# Patient Record
Sex: FEMALE | Race: WHITE | NOT HISPANIC OR LATINO | Employment: FULL TIME | URBAN - METROPOLITAN AREA
[De-identification: names, ages, dates, MRNs, and addresses within clinical notes are randomized per-mention and may not be internally consistent; named-entity substitution may affect disease eponyms.]

---

## 2017-02-20 ENCOUNTER — OFFICE VISIT (OUTPATIENT)
Dept: FAMILY MEDICINE CLINIC | Facility: CLINIC | Age: 37
End: 2017-02-20

## 2017-02-20 VITALS
HEART RATE: 67 BPM | SYSTOLIC BLOOD PRESSURE: 118 MMHG | HEIGHT: 68 IN | WEIGHT: 237 LBS | OXYGEN SATURATION: 98 % | BODY MASS INDEX: 35.92 KG/M2 | TEMPERATURE: 98.3 F | DIASTOLIC BLOOD PRESSURE: 80 MMHG

## 2017-02-20 DIAGNOSIS — F41.9 ANXIETY AND DEPRESSION: Primary | ICD-10-CM

## 2017-02-20 DIAGNOSIS — F32.A ANXIETY AND DEPRESSION: Primary | ICD-10-CM

## 2017-02-20 PROCEDURE — 99213 OFFICE O/P EST LOW 20 MIN: CPT | Performed by: FAMILY MEDICINE

## 2017-08-30 ENCOUNTER — OFFICE VISIT (OUTPATIENT)
Dept: FAMILY MEDICINE CLINIC | Facility: CLINIC | Age: 37
End: 2017-08-30

## 2017-08-30 VITALS
OXYGEN SATURATION: 98 % | DIASTOLIC BLOOD PRESSURE: 80 MMHG | BODY MASS INDEX: 36.58 KG/M2 | TEMPERATURE: 98.5 F | HEIGHT: 69 IN | HEART RATE: 64 BPM | SYSTOLIC BLOOD PRESSURE: 120 MMHG | WEIGHT: 247 LBS

## 2017-08-30 DIAGNOSIS — R10.9 SIDE PAIN: Primary | ICD-10-CM

## 2017-08-30 DIAGNOSIS — L98.9 SKIN LESION: ICD-10-CM

## 2017-08-30 PROCEDURE — 99213 OFFICE O/P EST LOW 20 MIN: CPT | Performed by: FAMILY MEDICINE

## 2017-08-30 NOTE — PROGRESS NOTES
Subjective   Sameera Marrero is a 36 y.o. female.   Chief Complaint   Patient presents with   • Skin Lesion   • side pain       History of Present Illness     #1 Side pain-it is on the right.  It started a few months ago.  It is on and off, achy pain.  Nothing makes it better or worse.  Intensity 4 out of 10.  There are no other symptoms associated.  No blood in urine.  It is not related to meals.  Patient had cholecystectomy in 2016.    #2 skin lesion-patient noticed it in April 2017.  It is on her head.  Patient was evaluated by dermatologist.  She had biopsy done which came up benign.  Patient was prescribed steroid cream and is going to start it today.  She is scheduled for follow-up with her dermatologist in one month.    The following portions of the patient's history were reviewed and updated as appropriate: allergies, current medications, past family history, past medical history, past social history, past surgical history and problem list.    Review of Systems   Constitutional: Negative.    HENT: Negative.    Respiratory: Negative.    Cardiovascular: Negative.    Gastrointestinal: Negative for abdominal pain and blood in stool.   Skin: Negative for wound.   Psychiatric/Behavioral: The patient is nervous/anxious.          Objective   Wt Readings from Last 3 Encounters:   08/30/17 247 lb (112 kg)   03/26/17 226 lb (103 kg)   02/20/17 237 lb (108 kg)      Vitals:    08/30/17 0920   BP: 120/80   Pulse: 64   Temp: 98.5 °F (36.9 °C)   SpO2: 98%     Temp Readings from Last 3 Encounters:   08/30/17 98.5 °F (36.9 °C)   03/26/17 97.7 °F (36.5 °C) (Oral)   02/20/17 98.3 °F (36.8 °C)     BP Readings from Last 3 Encounters:   08/30/17 120/80   03/26/17 114/70   02/20/17 118/80     Pulse Readings from Last 3 Encounters:   08/30/17 64   03/26/17 65   02/20/17 67       Physical Exam   Constitutional: She is oriented to person, place, and time. She appears well-developed and well-nourished.   HENT:   Head: Normocephalic  and atraumatic.   Neck: Neck supple. Carotid bruit is not present. No thyromegaly present.   Cardiovascular: Normal rate, regular rhythm and normal heart sounds.    Pulmonary/Chest: Effort normal and breath sounds normal.   Abdominal: Soft. She exhibits no distension and no mass. There is no tenderness.   No CVA.   Neurological: She is alert and oriented to person, place, and time.   Skin: Skin is warm, dry and intact.   Pink flat lesion on Lt scalp.   Psychiatric: She has a normal mood and affect. Her behavior is normal.       Assessment/Plan   Sameera was seen today for skin lesion and side pain.    Diagnoses and all orders for this visit:    Side pain    Skin lesion        #1 side pain-seems to be muscular.  Tylenol if needed.  Return to clinic if symptoms are not resolved in a few weeks, or sooner if worsening.      #2 skin lesion-follow-up per dermatologist.

## 2018-04-18 ENCOUNTER — CLINICAL SUPPORT (OUTPATIENT)
Dept: INTERNAL MEDICINE | Facility: CLINIC | Age: 38
End: 2018-04-18

## 2018-04-18 DIAGNOSIS — Z23 NEED FOR HEPATITIS A IMMUNIZATION: Primary | ICD-10-CM

## 2018-04-18 PROCEDURE — 90632 HEPA VACCINE ADULT IM: CPT | Performed by: FAMILY MEDICINE

## 2018-04-18 PROCEDURE — 90471 IMMUNIZATION ADMIN: CPT | Performed by: FAMILY MEDICINE

## 2018-04-24 ENCOUNTER — HOSPITAL ENCOUNTER (EMERGENCY)
Facility: HOSPITAL | Age: 38
Discharge: HOME | End: 2018-04-25
Attending: EMERGENCY MEDICINE | Admitting: EMERGENCY MEDICINE

## 2018-04-24 DIAGNOSIS — L23.7 ALLERGIC DERMATITIS DUE TO POISON IVY: ICD-10-CM

## 2018-04-24 DIAGNOSIS — L03.113 RIGHT ARM CELLULITIS: Primary | ICD-10-CM

## 2018-04-24 PROCEDURE — 96365 THER/PROPH/DIAG IV INF INIT: CPT

## 2018-04-24 PROCEDURE — 96375 TX/PRO/DX INJ NEW DRUG ADDON: CPT

## 2018-04-24 PROCEDURE — 25010000002 METHYLPREDNISOLONE PER 125 MG: Performed by: NURSE PRACTITIONER

## 2018-04-24 PROCEDURE — 85025 COMPLETE CBC W/AUTO DIFF WBC: CPT | Performed by: NURSE PRACTITIONER

## 2018-04-24 PROCEDURE — 99283 EMERGENCY DEPT VISIT LOW MDM: CPT

## 2018-04-24 PROCEDURE — 80048 BASIC METABOLIC PNL TOTAL CA: CPT | Performed by: NURSE PRACTITIONER

## 2018-04-24 PROCEDURE — 25010000003 CEFAZOLIN 1 GM/50ML SOLUTION: Performed by: NURSE PRACTITIONER

## 2018-04-24 RX ORDER — METHYLPREDNISOLONE SODIUM SUCCINATE 125 MG/2ML
125 INJECTION, POWDER, LYOPHILIZED, FOR SOLUTION INTRAMUSCULAR; INTRAVENOUS ONCE
Status: COMPLETED | OUTPATIENT
Start: 2018-04-24 | End: 2018-04-24

## 2018-04-24 RX ORDER — PREDNISONE 20 MG/1
20 TABLET ORAL DAILY
COMMUNITY
End: 2018-11-20

## 2018-04-24 RX ORDER — SODIUM CHLORIDE 0.9 % (FLUSH) 0.9 %
10 SYRINGE (ML) INJECTION AS NEEDED
Status: DISCONTINUED | OUTPATIENT
Start: 2018-04-24 | End: 2018-04-25 | Stop reason: HOSPADM

## 2018-04-24 RX ADMIN — METHYLPREDNISOLONE SODIUM SUCCINATE 125 MG: 125 INJECTION, POWDER, FOR SOLUTION INTRAMUSCULAR; INTRAVENOUS at 23:56

## 2018-04-24 RX ADMIN — CEFAZOLIN SODIUM 1 G: 1 INJECTION, SOLUTION INTRAVENOUS at 23:56

## 2018-04-25 VITALS
HEIGHT: 68 IN | BODY MASS INDEX: 37.89 KG/M2 | SYSTOLIC BLOOD PRESSURE: 118 MMHG | WEIGHT: 250 LBS | DIASTOLIC BLOOD PRESSURE: 74 MMHG | OXYGEN SATURATION: 97 % | TEMPERATURE: 99.1 F | HEART RATE: 82 BPM | RESPIRATION RATE: 18 BRPM

## 2018-04-25 LAB
ANION GAP SERPL CALCULATED.3IONS-SCNC: 14.3 MMOL/L
BASOPHILS # BLD AUTO: 0.03 10*3/MM3 (ref 0–0.2)
BASOPHILS NFR BLD AUTO: 0.3 % (ref 0–1.5)
BUN BLD-MCNC: 12 MG/DL (ref 6–20)
BUN/CREAT SERPL: 16.7 (ref 7–25)
CALCIUM SPEC-SCNC: 9.9 MG/DL (ref 8.6–10.5)
CHLORIDE SERPL-SCNC: 102 MMOL/L (ref 98–107)
CO2 SERPL-SCNC: 23.7 MMOL/L (ref 22–29)
CREAT BLD-MCNC: 0.72 MG/DL (ref 0.57–1)
DEPRECATED RDW RBC AUTO: 43.5 FL (ref 37–54)
EOSINOPHIL # BLD AUTO: 0.25 10*3/MM3 (ref 0–0.7)
EOSINOPHIL NFR BLD AUTO: 2.4 % (ref 0.3–6.2)
ERYTHROCYTE [DISTWIDTH] IN BLOOD BY AUTOMATED COUNT: 13.2 % (ref 11.7–13)
GFR SERPL CREATININE-BSD FRML MDRD: 92 ML/MIN/1.73
GLUCOSE BLD-MCNC: 90 MG/DL (ref 65–99)
HCT VFR BLD AUTO: 45.7 % (ref 35.6–45.5)
HGB BLD-MCNC: 14.8 G/DL (ref 11.9–15.5)
IMM GRANULOCYTES # BLD: 0.05 10*3/MM3 (ref 0–0.03)
IMM GRANULOCYTES NFR BLD: 0.5 % (ref 0–0.5)
LYMPHOCYTES # BLD AUTO: 2.71 10*3/MM3 (ref 0.9–4.8)
LYMPHOCYTES NFR BLD AUTO: 25.9 % (ref 19.6–45.3)
MCH RBC QN AUTO: 29.5 PG (ref 26.9–32)
MCHC RBC AUTO-ENTMCNC: 32.4 G/DL (ref 32.4–36.3)
MCV RBC AUTO: 91.2 FL (ref 80.5–98.2)
MONOCYTES # BLD AUTO: 0.69 10*3/MM3 (ref 0.2–1.2)
MONOCYTES NFR BLD AUTO: 6.6 % (ref 5–12)
NEUTROPHILS # BLD AUTO: 6.74 10*3/MM3 (ref 1.9–8.1)
NEUTROPHILS NFR BLD AUTO: 64.3 % (ref 42.7–76)
PLATELET # BLD AUTO: 219 10*3/MM3 (ref 140–500)
PMV BLD AUTO: 10.4 FL (ref 6–12)
POTASSIUM BLD-SCNC: 3.5 MMOL/L (ref 3.5–5.2)
RBC # BLD AUTO: 5.01 10*6/MM3 (ref 3.9–5.2)
SODIUM BLD-SCNC: 140 MMOL/L (ref 136–145)
WBC NRBC COR # BLD: 10.47 10*3/MM3 (ref 4.5–10.7)

## 2018-04-25 RX ORDER — CEPHALEXIN 500 MG/1
500 CAPSULE ORAL 4 TIMES DAILY
Qty: 20 CAPSULE | Refills: 0 | Status: SHIPPED | OUTPATIENT
Start: 2018-04-25 | End: 2018-04-30

## 2018-04-25 NOTE — ED TRIAGE NOTES
Pt reports she went to the [facility] today for possible poison ivy,  Pt states that now there are streaks going up her arm.

## 2018-04-25 NOTE — ED NOTES
Patient provided discharge instructions at this time.  Respirations are even and unlabored, chest rise and fall is equal in expansion.  All patients questions answered prior to departure from the ED.  Pt ambulated from ED with steady gait, capillary refill within normal limit, speech normal.       Aria Blas RN  04/25/18 0052

## 2018-04-25 NOTE — ED PROVIDER NOTES
EMERGENCY DEPARTMENT ENCOUNTER    Room Number:  19/19  Date seen:  4/25/2018  Time seen: 11:14 PM  PCP: Patito Carranza MD    HPI:  Chief complaint: rash  Context:Sameera Marrero is a 37 y.o. female who presents to the ED with c/o rash to her R forearm for the past 3 days with clear discharge. Pt states that she believes that she came into contact with poison ivy while cutting down plants in her yard. Pt also c/o R arm swelling and redness streaking up her arm. Pt denies SOA. Pt states that she was previously seen at a [facility] and was d/c on prednisone, but has not started this yet.     Timing: constant   Duration: 3 days   Location: R forearm   Quality: rash   Intensity/Severity: moderate   Associated Symptoms:R arm swelling and redness   Aggravating Factors: none stated   Alleviating Factors: none stated   Previous Episodes: none stated   Treatment before arrival: none    MEDICAL RECORD REVIEW    ALLERGIES  Review of patient's allergies indicates no known allergies.    PAST MEDICAL HISTORY  Active Ambulatory Problems     Diagnosis Date Noted   • Avitaminosis D 03/11/2016   • Hemorrhoids 06/20/2016     Resolved Ambulatory Problems     Diagnosis Date Noted   • No Resolved Ambulatory Problems     Past Medical History:   Diagnosis Date   • Anxiety    • Anxiety    • Bladder disorder    • Gallstones    • GERD (gastroesophageal reflux disease)    • Nausea    • Vitamin D deficiency        PAST SURGICAL HISTORY  Past Surgical History:   Procedure Laterality Date   • CHOLECYSTECTOMY WITH INTRAOPERATIVE CHOLANGIOGRAM N/A 10/25/2016    Procedure: LAPAROSCOPIC CHOLECYSTECTOMY WITH CHOLANGIOGRAM;  Surgeon: Delvin Torres MD;  Location: [facility];  Service:    • EXCISIONAL HEMORRHOIDECTOMY      IN OFFICE   • WISDOM TOOTH EXTRACTION         FAMILY HISTORY  Family History   Problem Relation Age of Onset   • Cancer Maternal Grandfather    • Cancer Other      COLON       SOCIAL HISTORY  Social History     Social  History   • Marital status: Single     Spouse name: N/A   • Number of children: N/A   • Years of education: N/A     Occupational History   • Not on file.     Social History Main Topics   • Smoking status: Never Smoker   • Smokeless tobacco: Never Used   • Alcohol use Yes      Comment:  1x a week BUT NOT RECENTLY   • Drug use: No   • Sexual activity: Not on file     Other Topics Concern   • Not on file     Social History Narrative   • No narrative on file       REVIEW OF SYSTEMS  Review of Systems   Constitutional: Negative for activity change, appetite change, diaphoresis and fever.   HENT: Negative for trouble swallowing.    Eyes: Negative for visual disturbance.   Respiratory: Negative for cough, chest tightness, shortness of breath and wheezing.    Cardiovascular: Negative for chest pain, palpitations and leg swelling.   Gastrointestinal: Negative for abdominal pain, diarrhea, nausea and vomiting.   Genitourinary: Negative for dysuria.   Musculoskeletal: Negative for back pain.        R arm swelling   Skin: Positive for color change (redness streaking up R arm) and rash.   Neurological: Negative for dizziness, speech difficulty and light-headedness.       PHYSICAL EXAM  ED Triage Vitals   Temp Heart Rate Resp BP SpO2   04/24/18 2307 04/24/18 2307 04/24/18 2307 04/24/18 2313 04/24/18 2307   99.1 °F (37.3 °C) 94 18 135/95 100 %      Temp src Heart Rate Source Patient Position BP Location FiO2 (%)   04/24/18 2307 -- 04/24/18 2313 04/24/18 2313 --   Tympanic  Sitting Left arm      Physical Exam   Constitutional: She is oriented to person, place, and time and well-developed, well-nourished, and in no distress. No distress.   HENT:   Head: Normocephalic and atraumatic.   Mouth/Throat: Uvula is midline and mucous membranes are normal.   Neck: Normal range of motion. Neck supple.   Cardiovascular: S1 normal, S2 normal and normal heart sounds.  Exam reveals no gallop and no friction rub.    No murmur  heard.  Pulmonary/Chest: Effort normal and breath sounds normal. She has no decreased breath sounds. She has no wheezes. She has no rhonchi. She has no rales.   Abdominal: Soft. Normal appearance. There is no rebound and no guarding.   Musculoskeletal: Normal range of motion.        Arms:  Lymphadenopathy:     She has no axillary adenopathy.        Right axillary: No pectoral and no lateral adenopathy present.   Neurological: She is alert and oriented to person, place, and time.   Skin: Skin is warm, dry and intact. Rash (to R forearm consistent with poison ivy) noted. There is erythema (medial R arm, both above and below R AC space).   Psychiatric: Affect and judgment normal.   Nursing note and vitals reviewed.      LAB RESULTS  Recent Results (from the past 24 hour(s))   CBC Auto Differential    Collection Time: 04/24/18 11:47 PM   Result Value Ref Range    WBC 10.47 4.50 - 10.70 10*3/mm3    RBC 5.01 3.90 - 5.20 10*6/mm3    Hemoglobin 14.8 11.9 - 15.5 g/dL    Hematocrit 45.7 (H) 35.6 - 45.5 %    MCV 91.2 80.5 - 98.2 fL    MCH 29.5 26.9 - 32.0 pg    MCHC 32.4 32.4 - 36.3 g/dL    RDW 13.2 (H) 11.7 - 13.0 %    RDW-SD 43.5 37.0 - 54.0 fl    MPV 10.4 6.0 - 12.0 fL    Platelets 219 140 - 500 10*3/mm3    Neutrophil % 64.3 42.7 - 76.0 %    Lymphocyte % 25.9 19.6 - 45.3 %    Monocyte % 6.6 5.0 - 12.0 %    Eosinophil % 2.4 0.3 - 6.2 %    Basophil % 0.3 0.0 - 1.5 %    Immature Grans % 0.5 0.0 - 0.5 %    Neutrophils, Absolute 6.74 1.90 - 8.10 10*3/mm3    Lymphocytes, Absolute 2.71 0.90 - 4.80 10*3/mm3    Monocytes, Absolute 0.69 0.20 - 1.20 10*3/mm3    Eosinophils, Absolute 0.25 0.00 - 0.70 10*3/mm3    Basophils, Absolute 0.03 0.00 - 0.20 10*3/mm3    Immature Grans, Absolute 0.05 (H) 0.00 - 0.03 10*3/mm3       I ordered the above labs and reviewed the results    MEDICATIONS GIVEN IN ER  Medications   sodium chloride 0.9 % flush 10 mL (not administered)   ceFAZolin (ANCEF) IVPB 1 g (0 g Intravenous Stopped 4/25/18 0015)    methylPREDNISolone sodium succinate (SOLU-Medrol) injection 125 mg (125 mg Intravenous Given 4/24/18 2356)       EKG  Interpreted by ED Physician    PROCEDURES  Procedures    COURSE & MEDICAL DECISION MAKING  Pertinent Labs and Imaging studies that were ordered and reviewed are noted above.  Results were reviewed/discussed with the patient and they were also made aware of online assess.  Pt also made aware that some labs, such as cultures, will not be resulted during ER visit and follow up with PMD is necessary.     PROGRESS AND CONSULTS    Progress Notes:    ED Course   2323  Reviewed pt's history and workup with Dr. Jaime.  After a bedside evaluation; Dr Jaime agrees with the plan of care.    2336  Ordered Ancef and solu-medrol for rash and cellulitis.    0019  Rechecked pt, who is resting comfortably. Discussed pt's cellulitis and plan to d/c the pt with antibiotics and to f/u closely with her PCP in 2 days to ensure that her cellulitis is improving. Advised pt to continue her steroids as prescribed and to return to the ER for worsening redness. Pt understands and agrees with the plan, and all questions were answered.     0021  The patient's history, physical exam, and lab findings were discussed with the physician, who also performed a face to face history and physical exam.  I discussed all results and noted any abnormalities with patient.  Discussed absoute need to recheck abnormalities with their family physician.  I answered any of the patient's questions.  Discussed plan for discharge, as there is no emergent indication for admission.  Pt is agreeable and understands need for follow up and repeat testing.  Pt is aware that discharge does not mean that nothing is wrong but it indicates no emergency is present and they must continue care with their family physician.  Pt is discharged with instructions to follow up with primary care doctor to have their blood pressure rechecked.       Disposition  "vitals:  /95 (BP Location: Left arm, Patient Position: Sitting)   Pulse 94   Temp 99.1 °F (37.3 °C) (Tympanic)   Resp 18   Ht 172.7 cm (68\")   Wt 113 kg (250 lb)   SpO2 100%   BMI 38.01 kg/m²       DIAGNOSIS  Final diagnoses:   Allergic dermatitis due to poison ivy   Right arm cellulitis       FOLLOW UP   Patito Carranza MD  [address] [facility]  [address] [place] [address]  [phone]    Schedule an appointment as soon as possible for a visit in 2 days  for wound check and to evaluate cellulitis improvement      RX     Medication List      New Prescriptions    cephalexin 500 MG capsule  Commonly known as:  KEFLEX  Take 1 capsule by mouth 4 (Four) Times a Day for 5 days.            Documentation assistance provided by narendra Rider for MEAGAN Grubbs.  Information recorded by the narendra was done at my direction and has been verified and validated by me.  Electronically signed by Devante Rider on 4/24/2018 at time 12:23 AM       Devante Rider  04/25/18 0023       MEAGAN Khoury  04/25/18 0042    "

## 2018-04-25 NOTE — DISCHARGE INSTRUCTIONS
It is very important that you make a follow-up in 2 days with your Primary Care Provider to evaluate your improvement from the cellulitis.      Return Precautions    Although you are being discharged from the ED today, I encourage you to return for worsening symptoms.  Things can, and do, change such that treatment at home with medication may not be adequate.      Specifically, return for any of the following: worsening of redness, streaking or tightness of right arm.     Chest pain, shortness of breath, pain or nausea and vomiting not controlled by medications provided.    Please make a follow up with your Primary Care Provider for a blood pressure recheck.

## 2018-04-25 NOTE — ED PROVIDER NOTES
Pt presents to the ED c/o a rash to her rigth forearm that began 3 days ago. pt states that she was seen at the [facility], diagnosed with poison ivy, and was prescribed sterids and Keflex. She states that she has not yet started he medications. Pt states that after returning home, she noticed streaking to her arm. On exam, Pt is resting comfortably, in no distress, and without focal neurologic deficit. There is a thickened plaque to the right forearm with serous drainage. There is streaking warmth and erythema proximally. Comparments soft, pulse palpable. I agree with the plan for labs and steroids in the ED.     Attestation:  The JOSE M and I have discussed this patient's history, physical exam, and treatment plan.  I have reviewed the documentation and personally had a face to face interaction with the patient. I affirm the documentation and agree with the treatment and plan.  The attached note describes my personal findings.      Documentation assistance provided by narendra Dutton for Dr Jaime Information recorded by the breannaibe was done at my direction and has been verified and validated by me.     Emperatriz Dutton  04/24/18 6581       Kong Jaime MD  04/25/18 0308

## 2018-04-26 ENCOUNTER — TELEPHONE (OUTPATIENT)
Dept: SOCIAL WORK | Facility: HOSPITAL | Age: 38
End: 2018-04-26

## 2018-04-26 NOTE — TELEPHONE ENCOUNTER
ED f/u phone call: states she is taking prescribed meds and infection seems to be getting better; has f/u thalia't w/ PCP tomorrow. No questions/concerns

## 2018-04-27 ENCOUNTER — OFFICE VISIT (OUTPATIENT)
Dept: INTERNAL MEDICINE | Facility: CLINIC | Age: 38
End: 2018-04-27

## 2018-04-27 VITALS
DIASTOLIC BLOOD PRESSURE: 70 MMHG | WEIGHT: 260 LBS | BODY MASS INDEX: 39.4 KG/M2 | TEMPERATURE: 98.4 F | SYSTOLIC BLOOD PRESSURE: 120 MMHG | OXYGEN SATURATION: 98 % | HEART RATE: 80 BPM | HEIGHT: 68 IN

## 2018-04-27 DIAGNOSIS — L23.7 CONTACT DERMATITIS DUE TO POISON IVY: Primary | ICD-10-CM

## 2018-04-27 PROCEDURE — 90471 IMMUNIZATION ADMIN: CPT | Performed by: FAMILY MEDICINE

## 2018-04-27 PROCEDURE — 90715 TDAP VACCINE 7 YRS/> IM: CPT | Performed by: FAMILY MEDICINE

## 2018-04-27 PROCEDURE — 99213 OFFICE O/P EST LOW 20 MIN: CPT | Performed by: FAMILY MEDICINE

## 2018-04-27 NOTE — PROGRESS NOTES
Subjective   Sameera Marrero is a 37 y.o. female.   Chief Complaint   Patient presents with   • Cellulitis       History of Present Illness     #1 Poison ivy dermatitis and cellulitis-patient was evaluated in ER on 4/24/18.  She worked in her yard 3 days earlier and developed rash with blisters on right forearm.  On the day of ER visit she noticed red strikes going up and down her right arm.  In ER she was diagnosed with poison ivy dermatitis and cellulitis.  She was treated with IV cefazolin and prednisone.  She was discharged home on Keflex and prednisone.  She takes it as prescribed.  She reports improvement.  The red strikes disappeared.  She has a few small new spots of redness and blisters on her abdomen, leg and left arm.  He complains of itching.    The following portions of the patient's history were reviewed and updated as appropriate: allergies, current medications, past family history, past medical history, past social history, past surgical history and problem list.    Review of Systems   Constitutional: Negative.    Respiratory: Negative.    Skin:        itching of skin   Psychiatric/Behavioral: Negative.          Objective   Wt Readings from Last 3 Encounters:   04/27/18 118 kg (260 lb)   04/24/18 113 kg (250 lb)   08/30/17 112 kg (247 lb)      Vitals:    04/27/18 1037   BP: 120/70   Pulse: 80   Temp: 98.4 °F (36.9 °C)   SpO2: 98%     Temp Readings from Last 3 Encounters:   04/27/18 98.4 °F (36.9 °C)   04/24/18 99.1 °F (37.3 °C) (Tympanic)   08/30/17 98.5 °F (36.9 °C)     BP Readings from Last 3 Encounters:   04/27/18 120/70   04/25/18 118/74   08/30/17 120/80     Pulse Readings from Last 3 Encounters:   04/27/18 80   04/25/18 82   08/30/17 64       Physical Exam   Constitutional: She appears well-developed and well-nourished.   Neck: Normal range of motion. Neck supple.   Cardiovascular: Normal rate, regular rhythm and normal heart sounds.    Pulmonary/Chest: Effort normal and breath sounds normal.    Skin: Skin is warm and dry.        Psychiatric: She has a normal mood and affect. Her behavior is normal.       Assessment/Plan   Sameera was seen today for cellulitis.    Diagnoses and all orders for this visit:    Contact dermatitis due to poison ivy        #1 poison ivy dermatitis-hospital records were reviewed.  Patient is improving.  No signs of cellulitis anymore.  I'm recommending Zyrtec to help with rash.  She will complete all medications as prescribed.  Return to clinic as needed.  Patient is advised about prevention of poison ivy dermatitis.  He is getting tetanus vaccine today.

## 2018-11-20 ENCOUNTER — OFFICE VISIT (OUTPATIENT)
Dept: INTERNAL MEDICINE | Facility: CLINIC | Age: 38
End: 2018-11-20

## 2018-11-20 VITALS
TEMPERATURE: 97.9 F | SYSTOLIC BLOOD PRESSURE: 114 MMHG | WEIGHT: 266 LBS | OXYGEN SATURATION: 98 % | BODY MASS INDEX: 40.32 KG/M2 | DIASTOLIC BLOOD PRESSURE: 72 MMHG | HEART RATE: 74 BPM | HEIGHT: 68 IN

## 2018-11-20 DIAGNOSIS — E66.01 MORBID OBESITY (HCC): ICD-10-CM

## 2018-11-20 DIAGNOSIS — L98.9 SKIN LESION: Primary | ICD-10-CM

## 2018-11-20 PROCEDURE — 99213 OFFICE O/P EST LOW 20 MIN: CPT | Performed by: FAMILY MEDICINE

## 2018-11-20 NOTE — PROGRESS NOTES
Subjective   Sameera Marrero is a 37 y.o. female.   Chief Complaint   Patient presents with   • Skin Lesion     Pt states she have spot on her head back of left ear side and its swelling.       History of Present Illness     #1 skin lesion above left ear.-Patient was evaluated by dermatologist last time in January 2018.  She had a biopsy which was negative.  She keeps checking the area for swelling or lymph nodes.  She would like me to look at it.  It seems to her that it keeps swelling on and off.  But she did not noticed any lymph nodes.  Skin lesion is gone.  No other symptoms associated other than itching on and off.  No redness, no blisters.  It was treated with steroid cream by her dermatologist.    #2 obesity-patient was successful in the past with loosing weight but was not able to maintain it.  She does not feel comfortable going to meetings.  She does not exercise regularly.    The following portions of the patient's history were reviewed and updated as appropriate: allergies, current medications, past medical history, past social history and problem list.    Review of Systems   Constitutional: Negative.    Respiratory: Negative.    Cardiovascular: Negative.          Objective   Wt Readings from Last 3 Encounters:   11/20/18 121 kg (266 lb)   04/27/18 118 kg (260 lb)   04/24/18 113 kg (250 lb)      Vitals:    11/20/18 0748   BP: 114/72   Pulse: 74   Temp: 97.9 °F (36.6 °C)   SpO2: 98%     Temp Readings from Last 3 Encounters:   11/20/18 97.9 °F (36.6 °C) (Oral)   04/27/18 98.4 °F (36.9 °C)   04/24/18 99.1 °F (37.3 °C) (Tympanic)     BP Readings from Last 3 Encounters:   11/20/18 114/72   04/27/18 120/70   04/25/18 118/74     Pulse Readings from Last 3 Encounters:   11/20/18 74   04/27/18 80   04/25/18 82       Physical Exam   Constitutional: She appears well-developed and well-nourished.   Lymphadenopathy:        Head (right side): No submental, no submandibular, no preauricular, no posterior auricular  and no occipital adenopathy present.        Head (left side): No submental, no submandibular, no preauricular, no posterior auricular and no occipital adenopathy present.     She has no cervical adenopathy.        Right cervical: No superficial cervical and no posterior cervical adenopathy present.       Left cervical: No superficial cervical and no posterior cervical adenopathy present.        Right: No supraclavicular adenopathy present.        Left: No supraclavicular adenopathy present.   Skin: Skin is warm and dry.   No skin lesions on the scalp,face or neck.  No redness, no swelling.   Psychiatric: She has a normal mood and affect. Her behavior is normal.       Assessment/Plan   Sameera was seen today for skin lesion.    Diagnoses and all orders for this visit:    Skin lesion    Morbid obesity (CMS/HCC)        #1 skin lesion-no lesion currently.  No lymph nodes, no swelling, no abnormality on exam.  Patient will watch it and if she notices any changes will return for evaluation.    #2 Morbid obesity-increased risk for developing diabetes and heart disease.  Patient is advised to start exercise at least 30 minutes a day, 5 days a week.  She is advised to join [employer].  She does not like to go to meetings, but she can try online version.

## 2018-12-07 ENCOUNTER — CLINICAL SUPPORT (OUTPATIENT)
Dept: INTERNAL MEDICINE | Facility: CLINIC | Age: 38
End: 2018-12-07

## 2018-12-07 PROCEDURE — 90471 IMMUNIZATION ADMIN: CPT | Performed by: FAMILY MEDICINE

## 2018-12-07 PROCEDURE — 90632 HEPA VACCINE ADULT IM: CPT | Performed by: FAMILY MEDICINE

## 2019-01-08 ENCOUNTER — OFFICE VISIT (OUTPATIENT)
Dept: INTERNAL MEDICINE | Facility: CLINIC | Age: 39
End: 2019-01-08

## 2019-01-08 VITALS
TEMPERATURE: 98 F | WEIGHT: 259 LBS | HEIGHT: 68 IN | SYSTOLIC BLOOD PRESSURE: 120 MMHG | OXYGEN SATURATION: 98 % | BODY MASS INDEX: 39.25 KG/M2 | DIASTOLIC BLOOD PRESSURE: 80 MMHG | HEART RATE: 78 BPM

## 2019-01-08 DIAGNOSIS — E66.9 CLASS 2 OBESITY WITHOUT SERIOUS COMORBIDITY WITH BODY MASS INDEX (BMI) OF 39.0 TO 39.9 IN ADULT, UNSPECIFIED OBESITY TYPE: ICD-10-CM

## 2019-01-08 DIAGNOSIS — Z00.00 PHYSICAL EXAM, ANNUAL: Primary | ICD-10-CM

## 2019-01-08 PROBLEM — E55.9 VITAMIN D DEFICIENCY: Status: ACTIVE | Noted: 2019-01-08

## 2019-01-08 PROCEDURE — 99395 PREV VISIT EST AGE 18-39: CPT | Performed by: FAMILY MEDICINE

## 2019-01-08 NOTE — PROGRESS NOTES
Subjective   Sameera Marrero is a 38 y.o. female.   Chief Complaint   Patient presents with   • Annual Exam       History of Present Illness     Patient is here for complete physical exam without GYN evaluation.    #1 CPE- she has no complaints.  There is no change in medical, surgical or family history from last office visit.  Patient takes no prescription medications.  Over-the-counter she takes multivitamin few times a week.  She takes naproxen as needed.  Last time about 6 months ago.  She does not take extra vitamin D.  She has a history of vitamin D deficiency.  She is not allergic to any medications.  She does not smoke cigarettes.  She never did.  Alcohol-about 3 times a week, 2-4 drinks.  No drugs.  Exercise -she walks irregularly, but is planning to do it more frequently.  She started working with .  She started calorie count and is more consistent with it.  She lost 7 pounds from last office visit.  She has dental appointments every 6 months.  Last was in fall 2018.  Her vision is normal.  She had tetanus vaccine April 2018.  She had flu vaccine this season.  She had both hepatitis A vaccines.  She saw her GYN Dr. Segovia in October 2018.  She reports pap smear, pelvic exam and breast exam were normal.    The following portions of the patient's history were reviewed and updated as appropriate: allergies, current medications, past family history, past medical history, past social history, past surgical history and problem list.    Review of Systems   Constitutional: Negative.    HENT: Negative.    Respiratory: Negative.    Cardiovascular: Negative.    Gastrointestinal: Negative.    Genitourinary: Negative.    Neurological: Negative.          Objective   Wt Readings from Last 3 Encounters:   01/08/19 117 kg (259 lb)   11/20/18 121 kg (266 lb)   04/27/18 118 kg (260 lb)      Vitals:    01/08/19 1002   BP: 120/80   Pulse: 78   Temp: 98 °F (36.7 °C)   SpO2: 98%     Temp Readings from Last 3  Encounters:   01/08/19 98 °F (36.7 °C)   11/20/18 97.9 °F (36.6 °C) (Oral)   04/27/18 98.4 °F (36.9 °C)     BP Readings from Last 3 Encounters:   01/08/19 120/80   11/20/18 114/72   04/27/18 120/70     Pulse Readings from Last 3 Encounters:   01/08/19 78   11/20/18 74   04/27/18 80       Physical Exam   Constitutional: She is oriented to person, place, and time. She appears well-developed and well-nourished. No distress.   HENT:   Head: Normocephalic and atraumatic. Hair is normal.   Right Ear: Hearing, tympanic membrane, external ear and ear canal normal. No drainage. No decreased hearing is noted.   Left Ear: Hearing, tympanic membrane, external ear and ear canal normal. No decreased hearing is noted.   Nose: No nasal deformity.   Mouth/Throat: Oropharynx is clear and moist.   Eyes: Conjunctivae, EOM and lids are normal. Pupils are equal, round, and reactive to light. Right eye exhibits no discharge. Left eye exhibits no discharge.   Neck: Normal range of motion. Neck supple. No JVD present. No tracheal deviation present. No thyromegaly present.   Cardiovascular: Normal rate, regular rhythm, normal heart sounds, intact distal pulses and normal pulses. Exam reveals no gallop and no friction rub.   No murmur heard.  Pulmonary/Chest: Effort normal and breath sounds normal. No respiratory distress. She has no wheezes. She has no rales. She exhibits no tenderness.   Abdominal: Soft. She exhibits no distension and no mass. There is no tenderness. There is no rebound and no guarding. No hernia.   Musculoskeletal: Normal range of motion. She exhibits no edema, tenderness or deformity.   Lymphadenopathy:     She has no cervical adenopathy.   Neurological: She is alert and oriented to person, place, and time. She has normal reflexes. She displays normal reflexes. No cranial nerve deficit. She exhibits normal muscle tone. Coordination normal.   Skin: Skin is warm and dry. No rash noted. She is not diaphoretic. No erythema.    Psychiatric: She has a normal mood and affect. Her behavior is normal. Judgment and thought content normal.   Vitals reviewed.      Assessment/Plan   Sameera was seen today for annual exam.    Diagnoses and all orders for this visit:    Physical exam, annual  -     CBC (No Diff)  -     Vitamin D 25 Hydroxy  -     Comprehensive Metabolic Panel  -     Lipid Panel With LDL / HDL Ratio  -     Thyroid Cascade Profile    Class 2 obesity without serious comorbidity with body mass index (BMI) of 39.0 to 39.9 in adult, unspecified obesity type        #1 CPE- pt is up-to-date with vaccinations and cancer screening.  Her major risk factor is obesity.  She did good job with losing 7 pounds, but she needs to continue to work on it.  Obesity increases risk for developing diabetes and heart disease.  Increase exercise to at least 30 minutes a day, 5 days a week.  We are checking labs.

## 2019-01-12 LAB
25(OH)D3+25(OH)D2 SERPL-MCNC: 26.7 NG/ML (ref 30–100)
ALBUMIN SERPL-MCNC: 4.3 G/DL (ref 3.5–5.2)
ALBUMIN/GLOB SERPL: 1.7 G/DL
ALP SERPL-CCNC: 62 U/L (ref 39–117)
ALT SERPL-CCNC: 24 U/L (ref 1–33)
AST SERPL-CCNC: 23 U/L (ref 1–32)
BILIRUB SERPL-MCNC: 0.7 MG/DL (ref 0.1–1.2)
BUN SERPL-MCNC: 7 MG/DL (ref 6–20)
BUN/CREAT SERPL: 8.3 (ref 7–25)
CALCIUM SERPL-MCNC: 9.5 MG/DL (ref 8.6–10.5)
CHLORIDE SERPL-SCNC: 101 MMOL/L (ref 98–107)
CHOLEST SERPL-MCNC: 158 MG/DL (ref 0–200)
CO2 SERPL-SCNC: 24.8 MMOL/L (ref 22–29)
CREAT SERPL-MCNC: 0.84 MG/DL (ref 0.57–1)
ERYTHROCYTE [DISTWIDTH] IN BLOOD BY AUTOMATED COUNT: 13.6 % (ref 11.7–13)
GLOBULIN SER CALC-MCNC: 2.5 GM/DL
GLUCOSE SERPL-MCNC: 90 MG/DL (ref 65–99)
HCT VFR BLD AUTO: 43.5 % (ref 35.6–45.5)
HDLC SERPL-MCNC: 46 MG/DL (ref 40–60)
HGB BLD-MCNC: 13.7 G/DL (ref 11.9–15.5)
LDLC SERPL CALC-MCNC: 90 MG/DL (ref 0–100)
LDLC/HDLC SERPL: 1.97 {RATIO}
MCH RBC QN AUTO: 29.3 PG (ref 26.9–32)
MCHC RBC AUTO-ENTMCNC: 31.5 G/DL (ref 32.4–36.3)
MCV RBC AUTO: 92.9 FL (ref 80.5–98.2)
PLATELET # BLD AUTO: 244 10*3/MM3 (ref 140–500)
POTASSIUM SERPL-SCNC: 3.9 MMOL/L (ref 3.5–5.2)
PROT SERPL-MCNC: 6.8 G/DL (ref 6–8.5)
RBC # BLD AUTO: 4.68 10*6/MM3 (ref 3.9–5.2)
SODIUM SERPL-SCNC: 139 MMOL/L (ref 136–145)
TRIGL SERPL-MCNC: 108 MG/DL (ref 0–150)
TSH SERPL DL<=0.005 MIU/L-ACNC: 1.22 UIU/ML (ref 0.45–4.5)
VLDLC SERPL CALC-MCNC: 21.6 MG/DL (ref 5–40)
WBC # BLD AUTO: 6.61 10*3/MM3 (ref 4.5–10.7)

## 2019-03-22 ENCOUNTER — OFFICE VISIT (OUTPATIENT)
Dept: INTERNAL MEDICINE | Facility: CLINIC | Age: 39
End: 2019-03-22

## 2019-03-22 VITALS
DIASTOLIC BLOOD PRESSURE: 80 MMHG | SYSTOLIC BLOOD PRESSURE: 118 MMHG | HEIGHT: 68 IN | OXYGEN SATURATION: 98 % | HEART RATE: 63 BPM | WEIGHT: 241 LBS | BODY MASS INDEX: 36.53 KG/M2 | TEMPERATURE: 97.8 F

## 2019-03-22 DIAGNOSIS — E55.9 VITAMIN D DEFICIENCY: Primary | ICD-10-CM

## 2019-03-22 DIAGNOSIS — L72.9 SKIN CYST: ICD-10-CM

## 2019-03-22 PROCEDURE — 99213 OFFICE O/P EST LOW 20 MIN: CPT | Performed by: FAMILY MEDICINE

## 2019-03-22 RX ORDER — CHOLECALCIFEROL (VITAMIN D3) 125 MCG
CAPSULE ORAL
COMMUNITY

## 2019-03-22 NOTE — PROGRESS NOTES
Subjective   Sameera Marrero is a 38 y.o. female.   Chief Complaint   Patient presents with   • Cyst     Pt c/o knot in back of her left ear/neck X 2 weeks.   • Vitamin D Deficiency       History of Present Illness     #1  Knot behind left ear-noticed about 2 weeks ago.  It is in the same area that she had checked before by dermatologist.  She noticed a small knot just below that spot that was checked by dermatologist.  It is not painful, it is not draining.  It is not changing in size.  There is no pain, no recent upper respiratory infection.  No rash.    2.  Vitamin D deficiency-vitamin D was low at 26.7.  Patient just started vitamin D 2000 units a day.    The following portions of the patient's history were reviewed and updated as appropriate: allergies, current medications, past medical history, past social history and problem list.    Review of Systems   Constitutional: Negative for chills and fever.   HENT: Negative.  Negative for ear pain.    Skin: Negative for rash.         Objective   Wt Readings from Last 3 Encounters:   03/22/19 109 kg (241 lb)   01/08/19 117 kg (259 lb)   11/20/18 121 kg (266 lb)      Vitals:    03/22/19 1508   BP: 118/80   Pulse: 63   Temp: 97.8 °F (36.6 °C)   SpO2: 98%     Temp Readings from Last 3 Encounters:   03/22/19 97.8 °F (36.6 °C) (Oral)   01/08/19 98 °F (36.7 °C)   11/20/18 97.9 °F (36.6 °C) (Oral)     BP Readings from Last 3 Encounters:   03/22/19 118/80   01/08/19 120/80   11/20/18 114/72     Pulse Readings from Last 3 Encounters:   03/22/19 63   01/08/19 78   11/20/18 74       Physical Exam   HENT:   Right Ear: Tympanic membrane, external ear and ear canal normal.   Left Ear: Tympanic membrane, external ear and ear canal normal.   Nose: Nose normal.   Patient pointed to the knot behind her left ear.  It is 2-3 millimeters cyst without redness, without inflammation.  It is not tender to touch.  No other nodes palpable around.  No lymphadenopathy.   Lymphadenopathy:         Head (right side): No submental, no submandibular, no preauricular and no posterior auricular adenopathy present.        Head (left side): No submental, no submandibular, no preauricular and no posterior auricular adenopathy present.     She has no cervical adenopathy.        Right cervical: No superficial cervical adenopathy present.       Left cervical: No superficial cervical adenopathy present.       Assessment/Plan   Sameera was seen today for cyst and vitamin d deficiency.    Diagnoses and all orders for this visit:    Vitamin D deficiency    Skin cyst        #1 skin cyst - tiny, noninfected cutaneous cyst behind left ear.  No lymphadenopathy.  Reassurance.  Return to clinic if increasing size, redness, pain.    2.  Vitamin D deficiency-continue current treatment.  Will check labs in 6 months.

## 2021-03-16 ENCOUNTER — IMMUNIZATION (OUTPATIENT)
Dept: VACCINE CLINIC | Facility: HOSPITAL | Age: 41
End: 2021-03-16

## 2021-03-16 PROCEDURE — 0001A: CPT | Performed by: OBSTETRICS & GYNECOLOGY

## 2021-03-16 PROCEDURE — 91300 HC SARSCOV02 VAC 30MCG/0.3ML IM: CPT | Performed by: OBSTETRICS & GYNECOLOGY

## 2021-04-06 ENCOUNTER — IMMUNIZATION (OUTPATIENT)
Dept: VACCINE CLINIC | Facility: HOSPITAL | Age: 41
End: 2021-04-06

## 2021-04-06 PROCEDURE — 91300 HC SARSCOV02 VAC 30MCG/0.3ML IM: CPT | Performed by: OBSTETRICS & GYNECOLOGY

## 2021-04-06 PROCEDURE — 0002A: CPT | Performed by: OBSTETRICS & GYNECOLOGY

## 2021-12-17 ENCOUNTER — IMMUNIZATION (OUTPATIENT)
Dept: VACCINE CLINIC | Facility: HOSPITAL | Age: 41
End: 2021-12-17

## 2021-12-17 PROCEDURE — 91300 HC SARSCOV02 VAC 30MCG/0.3ML IM: CPT | Performed by: INTERNAL MEDICINE

## 2021-12-17 PROCEDURE — 0004A HC ADM SARSCOV2 30MCG/0.3ML BOOSTER: CPT | Performed by: INTERNAL MEDICINE

## 2022-05-02 ENCOUNTER — TELEPHONE (OUTPATIENT)
Dept: INTERNAL MEDICINE | Facility: CLINIC | Age: 42
End: 2022-05-02

## 2022-05-02 NOTE — TELEPHONE ENCOUNTER
Caller: Sameera Marrero    Relationship to patient: Self    Best call back number: [phone]     Patient is needing: PATIENT CALLED IN AND WANTED TO SCHEDULE A PHYSICAL . PATIENT PCP DIDN'T HAVE AVALBILITY UNTIL November . PATIENT WANTED TO GET IN EARLIER. HUB SCHEDULED WITH APRN ON 5/6/2022 .

## 2022-05-18 ENCOUNTER — OFFICE VISIT (OUTPATIENT)
Dept: INTERNAL MEDICINE | Facility: CLINIC | Age: 42
End: 2022-05-18

## 2022-05-18 VITALS
TEMPERATURE: 97.5 F | OXYGEN SATURATION: 98 % | WEIGHT: 279 LBS | HEART RATE: 83 BPM | DIASTOLIC BLOOD PRESSURE: 78 MMHG | SYSTOLIC BLOOD PRESSURE: 120 MMHG | HEIGHT: 68 IN | BODY MASS INDEX: 42.28 KG/M2

## 2022-05-18 DIAGNOSIS — H93.8X3 CONGESTION OF BOTH EARS: ICD-10-CM

## 2022-05-18 DIAGNOSIS — M67.471 GANGLION OF FOOT, RIGHT: Primary | ICD-10-CM

## 2022-05-18 PROCEDURE — 99203 OFFICE O/P NEW LOW 30 MIN: CPT | Performed by: FAMILY MEDICINE

## 2022-05-18 NOTE — PROGRESS NOTES
Subjective   Sameera Marrero is a 41 y.o. female.   Chief Complaint   Patient presents with   • Foot Problem   • Ear Fullness   • Obesity       History of Present Illness       Last time patient was seen in our office in March 2019.    1.  Right foot ganglione- patient had ganglion cyst on right foot which was drained many years ago.  In December 2021 it was swollen and she saw a podiatrist.  It was drained and then she needed another drainage twice within the last month.  When ganglion is enlarged it hurts, but only when she presses over it.  There is no redness associated.    2.  Ears congestion- on and off for past few years.  She is not sure if it hurts when it is congested.  No other symptoms associated.  She did not try anything to make it better.    The following portions of the patient's history were reviewed and updated as appropriate: allergies, current medications, past family history, past medical history, past social history, past surgical history and problem list.    Review of Systems   Constitutional: Negative.    HENT: Positive for congestion. Negative for postnasal drip and rhinorrhea.    Skin: Negative for rash.         Objective   Wt Readings from Last 3 Encounters:   05/18/22 127 kg (279 lb)   03/22/19 109 kg (241 lb)   01/08/19 117 kg (259 lb)      Vitals:    05/18/22 1110   BP: 130/90   Pulse: 83   Temp: 97.5 °F (36.4 °C)   SpO2: 98%     Temp Readings from Last 3 Encounters:   05/18/22 97.5 °F (36.4 °C)   03/22/19 97.8 °F (36.6 °C) (Oral)   01/08/19 98 °F (36.7 °C)     BP Readings from Last 3 Encounters:   05/18/22 130/90   03/22/19 118/80   01/08/19 120/80     Pulse Readings from Last 3 Encounters:   05/18/22 83   03/22/19 63   01/08/19 78     Body mass index is 42.42 kg/m².    Physical Exam  Constitutional:       Appearance: She is well-developed.   HENT:      Head: Normocephalic and atraumatic.      Right Ear: Tympanic membrane, ear canal and external ear normal. No middle ear effusion.  Tympanic membrane is not erythematous.      Left Ear: Tympanic membrane, ear canal and external ear normal.  No middle ear effusion. Tympanic membrane is not erythematous.   Cardiovascular:      Rate and Rhythm: Normal rate and regular rhythm.      Heart sounds: Normal heart sounds.   Pulmonary:      Effort: Pulmonary effort is normal.      Breath sounds: Normal breath sounds.   Musculoskeletal:      Comments: Small ganglion on the right foot.  Mild tenderness to palpation over it.  No redness, no swelling.     Skin:     General: Skin is warm and dry.   Neurological:      Mental Status: She is alert and oriented to person, place, and time.         Assessment & Plan   Diagnoses and all orders for this visit:    1. Ganglion of foot, right (Primary)    2. Congestion of both ears        1.  Right foot ganglion - recurrent.  Patient is advised to follow-up with podiatrist.  She may need excision versus drainage only.  2.  Ears congestion- likely due to uncontrolled allergies.  Patient is advised to use Claritin or Zyrtec plus Flonase if needed.      She is scheduled for physical exam in July.

## 2022-07-12 ENCOUNTER — OFFICE VISIT (OUTPATIENT)
Dept: INTERNAL MEDICINE | Facility: CLINIC | Age: 42
End: 2022-07-12

## 2022-07-12 VITALS
OXYGEN SATURATION: 97 % | HEIGHT: 68 IN | WEIGHT: 274 LBS | HEART RATE: 76 BPM | DIASTOLIC BLOOD PRESSURE: 70 MMHG | SYSTOLIC BLOOD PRESSURE: 120 MMHG | TEMPERATURE: 96.9 F | BODY MASS INDEX: 41.52 KG/M2

## 2022-07-12 DIAGNOSIS — E66.01 MORBID OBESITY: ICD-10-CM

## 2022-07-12 DIAGNOSIS — Z00.00 PHYSICAL EXAM, ANNUAL: Primary | ICD-10-CM

## 2022-07-12 PROCEDURE — 99395 PREV VISIT EST AGE 18-39: CPT | Performed by: FAMILY MEDICINE

## 2022-07-12 NOTE — PROGRESS NOTES
Subjective   Sameera Marrero is a 41 y.o. female.   Chief Complaint   Patient presents with   • Annual Exam       History of Present Illness     1. CPE-patient is here for complete physical exam without GYN evaluation.  She has no complaints.    There is no change in medical, surgical or family history from last office visit.  No hospitalizations in the last year.  She takes no prescription medications.  Over-the-counter-she takes multivitamin vitamin D 3 at 1000 units a day only occasionally.  She is not allergic to any medications.  She does not use tobacco products, she never did.  She drinks 2-4 drinks a week.  No drugs.  No regular exercise.  Last dental appointment less than a year ago.  Last eye exam more than 2 years ago.  She just got vision insurance and is planning to use it.  Last Pap smear in 2018.  She sees GYN.  She had COVID-vaccine x3.  Tetanus vaccine 2018.    The following portions of the patient's history were reviewed and updated as appropriate: allergies, current medications, past family history, past medical history, past social history, past surgical history and problem list.    Review of Systems   Constitutional: Negative for chills and fever.   Respiratory: Negative.  Negative for cough.    Cardiovascular: Negative.    Gastrointestinal: Negative for blood in stool.   Genitourinary: Negative for hematuria.   Psychiatric/Behavioral: Negative for suicidal ideas.         Objective   Wt Readings from Last 3 Encounters:   07/12/22 124 kg (274 lb)   05/18/22 127 kg (279 lb)   03/22/19 109 kg (241 lb)      Vitals:    07/12/22 0922   BP: 120/70   Pulse: 76   Temp: 96.9 °F (36.1 °C)   SpO2: 97%     Temp Readings from Last 3 Encounters:   07/12/22 96.9 °F (36.1 °C) (Temporal)   05/18/22 97.5 °F (36.4 °C)   03/22/19 97.8 °F (36.6 °C) (Oral)     BP Readings from Last 3 Encounters:   07/12/22 120/70   05/18/22 120/78   03/22/19 118/80     Pulse Readings from Last 3 Encounters:   07/12/22 76   05/18/22  83   03/22/19 63     Body mass index is 41.67 kg/m².    Physical Exam  Vitals reviewed.   Constitutional:       General: She is not in acute distress.     Appearance: She is well-developed. She is not diaphoretic.   HENT:      Head: Normocephalic and atraumatic. Hair is normal.      Right Ear: Hearing, tympanic membrane, ear canal and external ear normal. No decreased hearing noted. No drainage.      Left Ear: Hearing, tympanic membrane, ear canal and external ear normal. No decreased hearing noted.      Nose: No nasal deformity.   Eyes:      General: Lids are normal.         Right eye: No discharge.         Left eye: No discharge.      Conjunctiva/sclera: Conjunctivae normal.      Pupils: Pupils are equal, round, and reactive to light.   Neck:      Thyroid: No thyromegaly.      Vascular: No JVD.      Trachea: No tracheal deviation.   Cardiovascular:      Rate and Rhythm: Normal rate and regular rhythm.      Pulses: Normal pulses.      Heart sounds: Normal heart sounds. No murmur heard.    No friction rub. No gallop.   Pulmonary:      Effort: Pulmonary effort is normal. No respiratory distress.      Breath sounds: Normal breath sounds. No wheezing or rales.   Chest:      Chest wall: No tenderness.   Breasts:      Right: No supraclavicular adenopathy.      Left: No supraclavicular adenopathy.       Abdominal:      General: There is no distension.      Palpations: Abdomen is soft. There is no mass.      Tenderness: There is no abdominal tenderness. There is no guarding or rebound.      Hernia: No hernia is present.   Musculoskeletal:         General: No tenderness or deformity. Normal range of motion.      Cervical back: Normal range of motion and neck supple.   Lymphadenopathy:      Cervical: No cervical adenopathy.      Upper Body:      Right upper body: No supraclavicular adenopathy.      Left upper body: No supraclavicular adenopathy.   Skin:     General: Skin is warm and dry.      Findings: No erythema or rash.    Neurological:      Mental Status: She is alert and oriented to person, place, and time.      Cranial Nerves: No cranial nerve deficit.      Motor: No abnormal muscle tone.      Coordination: Coordination normal.      Deep Tendon Reflexes: Reflexes are normal and symmetric. Reflexes normal.   Psychiatric:         Behavior: Behavior normal.         Thought Content: Thought content normal.         Judgment: Judgment normal.         Assessment & Plan   Diagnoses and all orders for this visit:    1. Physical exam, annual (Primary)  -     CBC (No Diff)  -     Comprehensive Metabolic Panel  -     Lipid Panel With LDL / HDL Ratio  -     Thyroid Cascade Profile  -     Vitamin D 25 Hydroxy  -     Hepatitis C Antibody    2. Morbid obesity (HCC)        1. CPE/ 2.  Morbid obesity- preventive counseling provided.  We are checking labs.  Obesity increases risk for developing diabetes and heart disease.  Information on counting calories to lose weight and exercise to lose weight provided.  Patient is advised to exercise at least 30 to 60 minutes a day, 5 days a week.  She is reminded to schedule visit with GYN.  She is due for Pap smear.  COVID booster #2 discussed.  Continue regular dental appointments at least every 6 months.  Sun protection discussed and recommended.

## 2022-07-13 LAB
25(OH)D3+25(OH)D2 SERPL-MCNC: 21.1 NG/ML (ref 30–100)
ALBUMIN SERPL-MCNC: 4.3 G/DL (ref 3.8–4.8)
ALBUMIN/GLOB SERPL: 1.7 {RATIO} (ref 1.2–2.2)
ALP SERPL-CCNC: 80 IU/L (ref 44–121)
ALT SERPL-CCNC: 17 IU/L (ref 0–32)
AST SERPL-CCNC: 21 IU/L (ref 0–40)
BILIRUB SERPL-MCNC: 0.5 MG/DL (ref 0–1.2)
BUN SERPL-MCNC: 11 MG/DL (ref 6–20)
BUN/CREAT SERPL: 13 (ref 9–23)
CALCIUM SERPL-MCNC: 8.9 MG/DL (ref 8.7–10.2)
CHLORIDE SERPL-SCNC: 102 MMOL/L (ref 96–106)
CHOLEST SERPL-MCNC: 175 MG/DL (ref 100–199)
CO2 SERPL-SCNC: 19 MMOL/L (ref 20–29)
CREAT SERPL-MCNC: 0.85 MG/DL (ref 0.57–1)
EGFRCR SERPLBLD CKD-EPI 2021: 89 ML/MIN/1.73
ERYTHROCYTE [DISTWIDTH] IN BLOOD BY AUTOMATED COUNT: 13.3 % (ref 11.7–15.4)
GLOBULIN SER CALC-MCNC: 2.6 G/DL (ref 1.5–4.5)
GLUCOSE SERPL-MCNC: ABNORMAL MG/DL
HCT VFR BLD AUTO: 43.4 % (ref 34–46.6)
HCV AB S/CO SERPL IA: <0.1 S/CO RATIO (ref 0–0.9)
HDLC SERPL-MCNC: 50 MG/DL
HGB BLD-MCNC: 14.3 G/DL (ref 11.1–15.9)
LDLC SERPL CALC-MCNC: 99 MG/DL (ref 0–99)
LDLC/HDLC SERPL: 2 RATIO (ref 0–3.2)
MCH RBC QN AUTO: 29.1 PG (ref 26.6–33)
MCHC RBC AUTO-ENTMCNC: 32.9 G/DL (ref 31.5–35.7)
MCV RBC AUTO: 88 FL (ref 79–97)
PLATELET # BLD AUTO: 288 X10E3/UL (ref 150–450)
POTASSIUM SERPL-SCNC: ABNORMAL MMOL/L
PROT SERPL-MCNC: 6.9 G/DL (ref 6–8.5)
RBC # BLD AUTO: 4.92 X10E6/UL (ref 3.77–5.28)
SODIUM SERPL-SCNC: 142 MMOL/L (ref 134–144)
TRIGL SERPL-MCNC: 149 MG/DL (ref 0–149)
TSH SERPL DL<=0.005 MIU/L-ACNC: 1.2 UIU/ML (ref 0.45–4.5)
VLDLC SERPL CALC-MCNC: 26 MG/DL (ref 5–40)
WBC # BLD AUTO: 6.8 X10E3/UL (ref 3.4–10.8)

## 2022-12-08 ENCOUNTER — OFFICE VISIT (OUTPATIENT)
Dept: OBSTETRICS AND GYNECOLOGY | Age: 42
End: 2022-12-08

## 2022-12-08 ENCOUNTER — APPOINTMENT (OUTPATIENT)
Dept: WOMENS IMAGING | Facility: HOSPITAL | Age: 42
End: 2022-12-08

## 2022-12-08 VITALS
DIASTOLIC BLOOD PRESSURE: 68 MMHG | SYSTOLIC BLOOD PRESSURE: 124 MMHG | WEIGHT: 280.4 LBS | HEIGHT: 68 IN | BODY MASS INDEX: 42.5 KG/M2

## 2022-12-08 DIAGNOSIS — Z11.3 SCREEN FOR STD (SEXUALLY TRANSMITTED DISEASE): ICD-10-CM

## 2022-12-08 DIAGNOSIS — Z12.4 SCREENING FOR CERVICAL CANCER: ICD-10-CM

## 2022-12-08 DIAGNOSIS — Z01.419 WELL WOMAN EXAM WITH ROUTINE GYNECOLOGICAL EXAM: Primary | ICD-10-CM

## 2022-12-08 DIAGNOSIS — Z76.89 ENCOUNTER TO ESTABLISH CARE: ICD-10-CM

## 2022-12-08 DIAGNOSIS — Z12.31 ENCOUNTER FOR SCREENING MAMMOGRAM FOR MALIGNANT NEOPLASM OF BREAST: ICD-10-CM

## 2022-12-08 PROCEDURE — 77063 BREAST TOMOSYNTHESIS BI: CPT | Performed by: STUDENT IN AN ORGANIZED HEALTH CARE EDUCATION/TRAINING PROGRAM

## 2022-12-08 PROCEDURE — 99386 PREV VISIT NEW AGE 40-64: CPT | Performed by: STUDENT IN AN ORGANIZED HEALTH CARE EDUCATION/TRAINING PROGRAM

## 2022-12-08 PROCEDURE — 77067 SCR MAMMO BI INCL CAD: CPT | Performed by: STUDENT IN AN ORGANIZED HEALTH CARE EDUCATION/TRAINING PROGRAM

## 2022-12-08 NOTE — PROGRESS NOTES
[facility]   Obstetrics and Gynecology   Routine Annual Visit    2022    Patient: Sameera Marrero          MR#:[id number]    History of Present Illness    Chief Complaint   Patient presents with   • Annual Exam   • Establish Care     NGYN - AE today, Last pap ?? nml, No problems today       42 y.o. female  who presents for annual exam. Reports menses every 4-7 weeks so some irregularity but never goes more than 7 wks without one.  Lasts 6-7 days.      Studies reviewed:  Last pap  and normal per patient, denies h/o high grade dysplasia  Has not had breast cancer screen with mammo    Obstetric History:  OB History        0    Para   0    Term   0       0    AB   0    Living   0       SAB   0    IAB   0    Ectopic   0    Molar   0    Multiple   0    Live Births   0               Menstrual History:     Patient's last menstrual period was 2022 (exact date).       Sexual History:   Sexually with male partner, desires STD testing      Social History:   Works in finance    ________________________________________  Patient Active Problem List   Diagnosis   • Hemorrhoids   • Morbid obesity (HCC)   • Vitamin D deficiency     Past Medical History:   Diagnosis Date   • Anxiety     NO MED   • Avitaminosis D 2016   • Chlamydia    • Gallstones    • GERD (gastroesophageal reflux disease)     possible   • Vitamin D deficiency      Past Surgical History:   Procedure Laterality Date   • CHOLECYSTECTOMY WITH INTRAOPERATIVE CHOLANGIOGRAM N/A 10/25/2016    Procedure: LAPAROSCOPIC CHOLECYSTECTOMY WITH CHOLANGIOGRAM;  Surgeon: Delvin Torres MD;  Location: [facility];  Service:    • EXCISIONAL HEMORRHOIDECTOMY      IN OFFICE   • WISDOM TOOTH EXTRACTION       Social History     Tobacco Use   Smoking Status Never   Smokeless Tobacco Never     Family History   Problem Relation Age of Onset   • No Known Problems Mother    • Cancer Maternal Grandfather    • Colon cancer Other   "  • Cancer Other         COLON   • Breast cancer Neg Hx    • Ovarian cancer Neg Hx    • Uterine cancer Neg Hx      Prior to Admission medications    Medication Sig Start Date End Date Taking? Authorizing Provider   Cholecalciferol (VITAMIN D3) 2000 units tablet Take  by mouth.   Yes ProviderYamileth MD   Multiple Vitamin (MULTI VITAMIN DAILY PO) Take 1 tablet by mouth Daily.   Yes Yamileth Rodriguez MD     ________________________________________    Current contraception: none  History of abnormal Pap smear: no  Family history of uterine or ovarian cancer: no  Family History of colon cancer/colon polyps: yes - see above  History of abnormal mammogram: no    The following portions of the patient's history were reviewed and updated as appropriate: allergies, current medications, past family history, past medical history, past social history, past surgical history and problem list.    Review of Systems   All other systems reviewed and are negative.           Objective     /68   Ht 172.7 cm (68\")   Wt 127 kg (280 lb 6.4 oz)   LMP 12/07/2022 (Exact Date)   Breastfeeding No   BMI 42.63 kg/m²    BP Readings from Last 3 Encounters:   12/08/22 124/68   07/12/22 120/70   05/18/22 120/78      Wt Readings from Last 3 Encounters:   12/08/22 127 kg (280 lb 6.4 oz)   07/12/22 124 kg (274 lb)   05/18/22 127 kg (279 lb)        BMI: Estimated body mass index is 42.63 kg/m² as calculated from the following:    Height as of this encounter: 172.7 cm (68\").    Weight as of this encounter: 127 kg (280 lb 6.4 oz).    Physical Exam  Vitals and nursing note reviewed.   Constitutional:       General: She is not in acute distress.     Appearance: Normal appearance.   HENT:      Head: Normocephalic and atraumatic.   Eyes:      Extraocular Movements: Extraocular movements intact.   Cardiovascular:      Rate and Rhythm: Normal rate and regular rhythm.      Pulses: Normal pulses.      Heart sounds: No murmur heard.  Pulmonary: "      Effort: Pulmonary effort is normal. No respiratory distress.      Breath sounds: Normal breath sounds.   Chest:   Breasts:     Right: Normal. No mass, nipple discharge, skin change or tenderness.      Left: Normal. No mass, nipple discharge, skin change or tenderness.   Abdominal:      General: There is no distension.      Palpations: Abdomen is soft. There is no mass.      Tenderness: There is no abdominal tenderness.   Genitourinary:     General: Normal vulva.      Labia:         Right: No rash or lesion.         Left: No rash or lesion.       Urethra: No prolapse, urethral swelling or urethral lesion.      Vagina: Normal.      Cervix: Normal.      Uterus: Normal.       Adnexa: Right adnexa normal and left adnexa normal.      Comments: Bladder: no masses or tenderness  Perineum/Anus: no masses, lesions, or skin changes  Musculoskeletal:         General: No swelling. Normal range of motion.      Cervical back: Normal range of motion.   Lymphadenopathy:      Upper Body:      Right upper body: No axillary adenopathy.      Left upper body: No axillary adenopathy.   Skin:     General: Skin is warm and dry.   Neurological:      General: No focal deficit present.      Mental Status: She is alert and oriented to person, place, and time.   Psychiatric:         Mood and Affect: Mood normal.         Behavior: Behavior normal.         As part of wellness and prevention, the following topics were discussed with the patient:  Encouraged self breast exam  Physical activity and regular exercised encouraged.   Injury prevention discussed.  Healthy weight discussed.  Nutrition discussed.  Substance abuse/misuse discussed.  Sexual behavior/safe practices discussed.   Sexual transmitted disease prevention   Contraception discussed.   Mental health discussed.           Assessment:  Diagnoses and all orders for this visit:    1. Well woman exam with routine gynecological exam (Primary)  -     Mammo Screening Digital Tomosynthesis  Bilateral With CAD  -     HIV-1 / O / 2 Ag / Antibody 4th Generation  -     RPR, Rfx Qn RPR / Confirm TP  -     IGP,CtNgTv,Apt HPV,rfx 16 / 18,45    2. Encounter to establish care    3. Encounter for screening mammogram for malignant neoplasm of breast  -     Mammo Screening Digital Tomosynthesis Bilateral With CAD    4. Screening for cervical cancer  -     IGP,CtNgTv,Apt HPV,rfx 16 / 18,45    5. Screen for STD (sexually transmitted disease)  -     HIV-1 / O / 2 Ag / Antibody 4th Generation  -     RPR, Rfx Qn RPR / Confirm TP  -     IGP,CtNgTv,Apt HPV,rfx 16 / 18,45    -Breast and pelvic exam normal  - STD screen and Pap today  - Mammogram ordered  - Discussed contraception and patient declines.      Plan:  Return in about 1 year (around 12/8/2023) for Annual.      Gemma Mckinley MD  12/8/2022 15:26 EST

## 2022-12-09 LAB
HIV 1+2 AB+HIV1 P24 AG SERPL QL IA: NON REACTIVE
RPR SER QL: NON REACTIVE

## 2022-12-19 LAB
C TRACH RRNA CVX QL NAA+PROBE: NEGATIVE
CYTOLOGIST CVX/VAG CYTO: NORMAL
CYTOLOGY CVX/VAG DOC CYTO: NORMAL
CYTOLOGY CVX/VAG DOC THIN PREP: NORMAL
DX ICD CODE: NORMAL
HIV 1 & 2 AB SER-IMP: NORMAL
HPV GENOTYPE REFLEX: NORMAL
HPV I/H RISK 4 DNA CVX QL PROBE+SIG AMP: NEGATIVE
N GONORRHOEA RRNA CVX QL NAA+PROBE: NEGATIVE
OTHER STN SPEC: NORMAL
STAT OF ADQ CVX/VAG CYTO-IMP: NORMAL
T VAGINALIS RRNA SPEC QL NAA+PROBE: NEGATIVE

## 2022-12-20 NOTE — PROGRESS NOTES
Please call patient to inform her Pap smear is normal and HPV negative. I recommend repeating in 5 years.  Also STD testing was negative.  Thank you!    Gemma Mckinley MD  9/29/2021  10:14 EDT

## 2022-12-28 ENCOUNTER — APPOINTMENT (OUTPATIENT)
Dept: WOMENS IMAGING | Facility: HOSPITAL | Age: 42
End: 2022-12-28
Payer: COMMERCIAL

## 2022-12-28 PROCEDURE — 77063 BREAST TOMOSYNTHESIS BI: CPT | Performed by: RADIOLOGY

## 2022-12-28 PROCEDURE — 77067 SCR MAMMO BI INCL CAD: CPT | Performed by: RADIOLOGY

## 2023-01-06 DIAGNOSIS — R92.8 ABNORMAL MAMMOGRAM: Primary | ICD-10-CM

## 2023-01-18 ENCOUNTER — APPOINTMENT (OUTPATIENT)
Dept: WOMENS IMAGING | Facility: HOSPITAL | Age: 43
End: 2023-01-18
Payer: COMMERCIAL

## 2023-01-18 PROCEDURE — 77061 BREAST TOMOSYNTHESIS UNI: CPT | Performed by: RADIOLOGY

## 2023-01-18 PROCEDURE — 77065 DX MAMMO INCL CAD UNI: CPT | Performed by: RADIOLOGY

## 2023-01-18 PROCEDURE — 76642 ULTRASOUND BREAST LIMITED: CPT | Performed by: RADIOLOGY

## 2023-01-18 PROCEDURE — G0279 TOMOSYNTHESIS, MAMMO: HCPCS | Performed by: RADIOLOGY

## 2023-01-23 ENCOUNTER — TELEPHONE (OUTPATIENT)
Dept: OBSTETRICS AND GYNECOLOGY | Age: 43
End: 2023-01-23
Payer: COMMERCIAL

## 2023-01-23 DIAGNOSIS — R92.8 ABNORMAL MAMMOGRAM: Primary | ICD-10-CM

## 2023-01-23 NOTE — TELEPHONE ENCOUNTER
Caller: Sameera Marrero    Relationship: Self    Best call back number: [phone]    Caller requesting test results: MAMMOGRAM    What test was performed: MAMMOGRAM    When was the test performed:     Where was the test performed: [facility]    Additional notes:  DR HURTADO SENT PT MY CHART MESSAGE  ABOUT RESULTS. PT DOES HAVE QUESTIONS AND WOULD LIKE A CALL BACK. SHE CAN BE REACHED AT ANYTIME AND YOU CAN LVM

## 2023-01-23 NOTE — PROGRESS NOTES
I sent patient message below to inform her of probably benign cyst in her breast.  They recommend repeat imaging in 6 months so I have placed these orders.  Please let me know if you need anything else to arrange.

## 2023-02-24 ENCOUNTER — TELEPHONE (OUTPATIENT)
Dept: OBSTETRICS AND GYNECOLOGY | Age: 43
End: 2023-02-24
Payer: COMMERCIAL

## 2023-02-24 NOTE — TELEPHONE ENCOUNTER
PT calls c/o her cycle last week ended on Tuesday and she has started another cycle today. She is not taking any BCP or using any devices. PT feels like her hormones are out of balance, she has not had regular sleep for months. She is usually very regular having a cycle every 4-5 weeks. PT denies any heavy bleeding, it is more than spotting, she is having to use a tampon. Pt denies pains, only light/dull cramping. Pt wanting to make sure she should not be concerned. Please advise

## 2023-02-27 NOTE — TELEPHONE ENCOUNTER
I spoke with patient.  She has a history of irregular menses but finished her menses a week ago then restarted bleeding a few days later.  She has noted passage of small blood clots with possible tissue as well so just wants to know that this is normal.  She has not had any intercourse since her last menses.  We discussed that it is reasonable to monitor at this time.  With irregular menses, there is sometimes excess endometrium that will shed every few cycles so I suspect that this is what is going on.  If it continues for more than 1 cycle or if the bleeding becomes more significant, I asked that she call to make an appointment for an ultrasound to evaluate.  All patient questions answered.

## 2023-07-20 ENCOUNTER — APPOINTMENT (OUTPATIENT)
Dept: WOMENS IMAGING | Facility: HOSPITAL | Age: 43
End: 2023-07-20
Payer: COMMERCIAL

## 2023-07-20 PROCEDURE — 76642 ULTRASOUND BREAST LIMITED: CPT | Performed by: RADIOLOGY

## 2023-07-20 PROCEDURE — G0279 TOMOSYNTHESIS, MAMMO: HCPCS | Performed by: RADIOLOGY

## 2023-07-20 PROCEDURE — 77061 BREAST TOMOSYNTHESIS UNI: CPT | Performed by: RADIOLOGY

## 2023-07-20 PROCEDURE — 77065 DX MAMMO INCL CAD UNI: CPT | Performed by: RADIOLOGY

## 2023-07-24 DIAGNOSIS — R92.8 ABNORMAL MAMMOGRAM: Primary | ICD-10-CM

## 2023-08-03 ENCOUNTER — APPOINTMENT (OUTPATIENT)
Dept: WOMENS IMAGING | Facility: HOSPITAL | Age: 43
End: 2023-08-03
Payer: COMMERCIAL

## 2023-08-03 ENCOUNTER — LAB REQUISITION (OUTPATIENT)
Dept: LAB | Facility: HOSPITAL | Age: 43
End: 2023-08-03
Payer: COMMERCIAL

## 2023-08-03 DIAGNOSIS — N63.0 UNSPECIFIED LUMP IN UNSPECIFIED BREAST: ICD-10-CM

## 2023-08-03 PROCEDURE — 88342 IMHCHEM/IMCYTCHM 1ST ANTB: CPT | Performed by: STUDENT IN AN ORGANIZED HEALTH CARE EDUCATION/TRAINING PROGRAM

## 2023-08-03 PROCEDURE — 88305 TISSUE EXAM BY PATHOLOGIST: CPT | Performed by: STUDENT IN AN ORGANIZED HEALTH CARE EDUCATION/TRAINING PROGRAM

## 2023-08-03 PROCEDURE — 19083 BX BREAST 1ST LESION US IMAG: CPT | Performed by: RADIOLOGY

## 2023-08-03 PROCEDURE — 88360 TUMOR IMMUNOHISTOCHEM/MANUAL: CPT | Performed by: STUDENT IN AN ORGANIZED HEALTH CARE EDUCATION/TRAINING PROGRAM

## 2023-08-03 PROCEDURE — A4648 IMPLANTABLE TISSUE MARKER: HCPCS | Performed by: RADIOLOGY

## 2023-08-03 PROCEDURE — 88341 IMHCHEM/IMCYTCHM EA ADD ANTB: CPT | Performed by: STUDENT IN AN ORGANIZED HEALTH CARE EDUCATION/TRAINING PROGRAM

## 2023-08-04 LAB
LAB AP CASE REPORT: NORMAL
LAB AP DIAGNOSIS COMMENT: NORMAL
PATH REPORT.FINAL DX SPEC: NORMAL
PATH REPORT.GROSS SPEC: NORMAL

## 2023-08-04 NOTE — PROGRESS NOTES
I spoke with patient regarding benign breast biopsy.  She had a few findings in her right breast that needed repeat imaging.  Would you mind to make sure she has the right follow-up arranged?

## 2023-08-07 DIAGNOSIS — R92.8 ABNORMAL MAMMOGRAM: Primary | ICD-10-CM

## 2023-08-28 ENCOUNTER — OFFICE VISIT (OUTPATIENT)
Dept: INTERNAL MEDICINE | Facility: CLINIC | Age: 43
End: 2023-08-28
Payer: COMMERCIAL

## 2023-08-28 VITALS
SYSTOLIC BLOOD PRESSURE: 118 MMHG | DIASTOLIC BLOOD PRESSURE: 72 MMHG | OXYGEN SATURATION: 99 % | HEIGHT: 68 IN | TEMPERATURE: 98.9 F | WEIGHT: 280.4 LBS | HEART RATE: 74 BPM | BODY MASS INDEX: 42.5 KG/M2

## 2023-08-28 DIAGNOSIS — J01.00 ACUTE NON-RECURRENT MAXILLARY SINUSITIS: Primary | ICD-10-CM

## 2023-08-28 PROCEDURE — 99213 OFFICE O/P EST LOW 20 MIN: CPT | Performed by: NURSE PRACTITIONER

## 2023-08-28 RX ORDER — AMOXICILLIN 875 MG/1
875 TABLET, COATED ORAL 2 TIMES DAILY
Qty: 14 TABLET | Refills: 0 | Status: SHIPPED | OUTPATIENT
Start: 2023-08-28

## 2023-08-28 NOTE — PROGRESS NOTES
Subjective   Sameera Marrero is a 42 y.o. female. Patient is here today for   Chief Complaint   Patient presents with    Facial Pain    Dental Pain    Nasal Congestion   .    History of Present Illness   C/o nasal congestion for 2 1/2 weeks associated with post-nasal drainage, teeth pain. She took Claritin which didn't help. Denies fever.   Covid test was negative.     The following portions of the patient's history were reviewed and updated as appropriate: allergies, current medications, past family history, past medical history, past social history, past surgical history and problem list.    Review of Systems    Objective   Vitals:    08/28/23 1520   BP: 118/72   Pulse: 74   Temp: 98.9 øF (37.2 øC)   SpO2: 99%     Body mass index is 42.64 kg/mý.  Physical Exam  Vitals and nursing note reviewed.   Constitutional:       Appearance: Normal appearance. She is well-developed.   HENT:      Right Ear: Ear canal normal. A middle ear effusion is present.      Left Ear: Ear canal normal. A middle ear effusion is present.      Mouth/Throat:      Pharynx: Posterior oropharyngeal erythema present.   Cardiovascular:      Rate and Rhythm: Normal rate and regular rhythm.      Heart sounds: Normal heart sounds.   Pulmonary:      Effort: Pulmonary effort is normal.      Breath sounds: Normal breath sounds.   Skin:     General: Skin is warm and dry.   Neurological:      Mental Status: She is alert and oriented to person, place, and time.   Psychiatric:         Speech: Speech normal.         Behavior: Behavior normal.         Thought Content: Thought content normal.       Assessment & Plan   Diagnoses and all orders for this visit:    1. Acute non-recurrent maxillary sinusitis (Primary)  -     amoxicillin (AMOXIL) 875 MG tablet; Take 1 tablet by mouth 2 (Two) Times a Day.  Dispense: 14 tablet; Refill: 0      Will prescribe amoxicillin for her sinusitis. She can also take Mucinex to loosen up her congestion. F/u if symptoms do not  improve

## 2023-09-26 ENCOUNTER — OFFICE VISIT (OUTPATIENT)
Dept: INTERNAL MEDICINE | Facility: CLINIC | Age: 43
End: 2023-09-26
Payer: COMMERCIAL

## 2023-09-26 VITALS
SYSTOLIC BLOOD PRESSURE: 116 MMHG | OXYGEN SATURATION: 98 % | HEIGHT: 68 IN | WEIGHT: 283.7 LBS | TEMPERATURE: 97.6 F | HEART RATE: 70 BPM | DIASTOLIC BLOOD PRESSURE: 76 MMHG | BODY MASS INDEX: 43 KG/M2

## 2023-09-26 DIAGNOSIS — B02.9 HERPES ZOSTER WITHOUT COMPLICATION: Primary | ICD-10-CM

## 2023-09-26 PROCEDURE — 99213 OFFICE O/P EST LOW 20 MIN: CPT | Performed by: NURSE PRACTITIONER

## 2023-09-26 NOTE — PROGRESS NOTES
Subjective   Sameera Marrero is a 42 y.o. female. Patient is here today for   Chief Complaint   Patient presents with    Rash     Upper right abdomen - noticed rash about a week ago - rash and burning sensation   .    History of Present Illness   C/o rash almost a week ago associated with some itching, some burning. She used some rubbing alcohol on it with no relief.   She has had some increased stress.     The following portions of the patient's history were reviewed and updated as appropriate: allergies, current medications, past family history, past medical history, past social history, past surgical history and problem list.    Review of Systems    Objective   Vitals:    09/26/23 1619   BP: 116/76   Pulse: 70   Temp: 97.6 °F (36.4 °C)   SpO2: 98%     Body mass index is 43.15 kg/m².  Physical Exam  Vitals and nursing note reviewed.   Constitutional:       Appearance: Normal appearance. She is well-developed.   Cardiovascular:      Rate and Rhythm: Normal rate and regular rhythm.      Heart sounds: Normal heart sounds.   Pulmonary:      Effort: Pulmonary effort is normal.      Breath sounds: Normal breath sounds.   Skin:     General: Skin is warm and dry.          Neurological:      Mental Status: She is alert and oriented to person, place, and time.   Psychiatric:         Speech: Speech normal.         Behavior: Behavior normal.         Thought Content: Thought content normal.       Assessment & Plan   Diagnoses and all orders for this visit:    1. Herpes zoster without complication (Primary)      Discussed possible treatment options with patient. It has been a week and patient hasn't had any new lesions in a few days, so she didn't want any valtrex at this time. F/u with any concerns

## 2023-11-30 ENCOUNTER — TELEPHONE (OUTPATIENT)
Dept: OBSTETRICS AND GYNECOLOGY | Age: 43
End: 2023-11-30
Payer: COMMERCIAL

## 2023-11-30 NOTE — TELEPHONE ENCOUNTER
Caller: Sameera Marrero    Relationship to patient: Self    Best call back number: [phone]    Patient is needing: PT IS REQUESTING TO SPEAK TO A NURSE, PT DID NOT WISH TO PROVIDE ANY INFORMATION TO HUB AT THIS TIME, PLEASE ADVISE PT.

## 2023-12-04 ENCOUNTER — TELEPHONE (OUTPATIENT)
Dept: INTERNAL MEDICINE | Facility: CLINIC | Age: 43
End: 2023-12-04
Payer: COMMERCIAL

## 2023-12-04 NOTE — TELEPHONE ENCOUNTER
Caller: Sameera Marrero    Relationship to patient: Self    Best call back number: [phone]    Patient is needing: SHE WOULD LIKE TO KNOW WHEN SHE SHOULD GET A FLU SHOT.  SHE IS JUST GETTING OVER COVID BY ABOUT 5 DAYS.  SHE TESTED POSITIVE RIGHT BEFORE THANKSGIVING.  SHOULD SHE WAIT TO GET ONE AND IF SO, HOW LONG?  SHE HAS TESTED NEGATIVE NOW.

## 2023-12-28 ENCOUNTER — OFFICE VISIT (OUTPATIENT)
Dept: OBSTETRICS AND GYNECOLOGY | Age: 43
End: 2023-12-28
Payer: COMMERCIAL

## 2023-12-28 VITALS
WEIGHT: 293 LBS | HEIGHT: 68 IN | DIASTOLIC BLOOD PRESSURE: 82 MMHG | BODY MASS INDEX: 44.41 KG/M2 | SYSTOLIC BLOOD PRESSURE: 128 MMHG

## 2023-12-28 DIAGNOSIS — N32.81 OVERACTIVE BLADDER: ICD-10-CM

## 2023-12-28 DIAGNOSIS — Z11.3 SCREEN FOR STD (SEXUALLY TRANSMITTED DISEASE): ICD-10-CM

## 2023-12-28 DIAGNOSIS — Z83.719 FHX: COLONIC POLYPS: ICD-10-CM

## 2023-12-28 DIAGNOSIS — Z01.419 WELL WOMAN EXAM WITH ROUTINE GYNECOLOGICAL EXAM: Primary | ICD-10-CM

## 2023-12-28 DIAGNOSIS — R92.8 ABNORMAL MAMMOGRAM OF RIGHT BREAST: ICD-10-CM

## 2023-12-28 PROCEDURE — 99396 PREV VISIT EST AGE 40-64: CPT | Performed by: STUDENT IN AN ORGANIZED HEALTH CARE EDUCATION/TRAINING PROGRAM

## 2023-12-28 NOTE — PROGRESS NOTES
[facility]   Obstetrics and Gynecology   Routine Annual Visit    2023    Patient: Sameera Marrero          MR#:[id number]    History of Present Illness    Chief Complaint   Patient presents with    Annual Exam     AE today, Last AE 2022 w/pap nml and HPV neg, MG 2022, c/o small amount of discharge from right nipple       43 y.o. female  who presents for annual exam.  Reports onset of right nipple discharge in last 6-12 mo.  Notes very small amount of yellow/gray discharge expressed from nipple intermittently.  Does not drain spontaneously.  No pain.  Underwent right breast biopsy this year that showed fibroadenoma.  Repeat imaging planned for February.    Saw [facility] recently for OAB.  Recommended weight loss and lifestyle changes.    Studies reviewed:  US Guided Breast Biopsy With & Without Device initial Right (2023) right breast bx c/w fibroadenoma, recs for repeat bilateral diagnostic mammo and limited right US in 6 mo - due 24 - scheduled at [facility]  IGP,CtNgTv,Apt HPV,rfx 16 / 18,45 (2022 15:34) NIL, HPV neg, denies h/o abnormal paps  Never had colonoscopy    Obstetric History:  OB History          0    Para   0    Term   0       0    AB   0    Living   0         SAB   0    IAB   0    Ectopic   0    Molar   0    Multiple   0    Live Births   0               Menstrual History:     Patient's last menstrual period was 2023 (approximate).       Sexual History:   Sexually with male partner, desires STD testing       Social History:   Works in finance     ________________________________________  Patient Active Problem List   Diagnosis    Hemorrhoids    Morbid obesity    Vitamin D deficiency    Abnormal mammogram of right breast     Past Medical History:   Diagnosis Date    Anxiety     NO MED    Avitaminosis D 2016    Chlamydia     Gallstones     GERD (gastroesophageal reflux disease)     possible    Vitamin D deficiency      Past  "Surgical History:   Procedure Laterality Date    BREAST BIOPSY Right     fibroadenoma    CHOLECYSTECTOMY WITH INTRAOPERATIVE CHOLANGIOGRAM N/A 10/25/2016    Procedure: LAPAROSCOPIC CHOLECYSTECTOMY WITH CHOLANGIOGRAM;  Surgeon: Delvin Torres MD;  Location: [facility];  Service:     EXCISIONAL HEMORRHOIDECTOMY      IN OFFICE    WISDOM TOOTH EXTRACTION       Social History     Tobacco Use   Smoking Status Never    Passive exposure: Never   Smokeless Tobacco Never     Family History   Problem Relation Age of Onset    No Known Problems Mother     Cancer Maternal Grandfather         Unknown type    Colon cancer Other     Breast cancer Neg Hx     Ovarian cancer Neg Hx     Uterine cancer Neg Hx      Prior to Admission medications    Medication Sig Start Date End Date Taking? Authorizing Provider   Cholecalciferol (VITAMIN D3) 2000 units tablet Take  by mouth.   Yes ProviderYamileth MD   Multiple Vitamin (MULTI VITAMIN DAILY PO) Take 1 tablet by mouth Daily.   Yes Yamileth Rodriguez MD   Simethicone (GAS-X PO) Take  by mouth As Needed.   Yes Yamileth Rodriguez MD     ________________________________________    Current contraception: none  History of abnormal Pap smear: no  Family history of uterine or ovarian cancer: no  Family History of colon cancer/colon polyps: yes - see above  History of abnormal mammogram: yes - fibroadenoma    The following portions of the patient's history were reviewed and updated as appropriate: allergies, current medications, past family history, past medical history, past social history, past surgical history, and problem list.    Review of Systems   All other systems reviewed and are negative.           Objective     /82   Ht 172.7 cm (68\")   Wt 133 kg (293 lb 9.6 oz)   LMP 11/29/2023 (Approximate)   Breastfeeding No   BMI 44.64 kg/m²    BP Readings from Last 3 Encounters:   12/28/23 128/82   09/26/23 116/76   08/28/23 118/72      Wt Readings from Last 3 Encounters: " "  12/28/23 133 kg (293 lb 9.6 oz)   09/26/23 129 kg (283 lb 11.2 oz)   08/28/23 127 kg (280 lb 6.4 oz)        BMI: Estimated body mass index is 44.64 kg/m² as calculated from the following:    Height as of this encounter: 172.7 cm (68\").    Weight as of this encounter: 133 kg (293 lb 9.6 oz).    Physical Exam  Vitals and nursing note reviewed.   Constitutional:       General: She is not in acute distress.     Appearance: Normal appearance.   HENT:      Head: Normocephalic and atraumatic.   Eyes:      Extraocular Movements: Extraocular movements intact.   Cardiovascular:      Rate and Rhythm: Normal rate and regular rhythm.      Pulses: Normal pulses.      Heart sounds: No murmur heard.  Pulmonary:      Effort: Pulmonary effort is normal. No respiratory distress.      Breath sounds: Normal breath sounds.   Chest:   Breasts:     Right: Normal. No mass, nipple discharge, skin change or tenderness.      Left: Normal. No mass, nipple discharge, skin change or tenderness.   Abdominal:      General: There is no distension.      Palpations: Abdomen is soft. There is no mass.      Tenderness: There is no abdominal tenderness.   Genitourinary:     General: Normal vulva.      Labia:         Right: No rash or lesion.         Left: No rash or lesion.       Urethra: No prolapse, urethral swelling or urethral lesion.      Vagina: Normal.      Cervix: Normal.      Uterus: Normal.       Adnexa: Right adnexa normal and left adnexa normal.      Comments: Bladder: no masses or tenderness  Perineum/Anus: no masses, lesions, or skin changes  Musculoskeletal:         General: No swelling. Normal range of motion.      Cervical back: Normal range of motion.   Lymphadenopathy:      Upper Body:      Right upper body: No axillary adenopathy.      Left upper body: No axillary adenopathy.   Skin:     General: Skin is warm and dry.   Neurological:      General: No focal deficit present.      Mental Status: She is alert and oriented to person, " place, and time.   Psychiatric:         Mood and Affect: Mood normal.         Behavior: Behavior normal.         As part of wellness and prevention, the following topics were discussed with the patient:  Encouraged self breast exam  Physical activity and regular exercised encouraged.   Injury prevention discussed.  Healthy weight discussed.  Nutrition discussed.  Substance abuse/misuse discussed.  Sexual behavior/safe practices discussed.   Sexual transmitted disease prevention   Contraception discussed.   Mental health discussed.           Assessment:  Diagnoses and all orders for this visit:    1. Well woman exam with routine gynecological exam (Primary)  -     Chlamydia trachomatis, Neisseria gonorrhoeae, Trichomonas vaginalis, PCR - Swab, Vagina  -     HIV-1 / O / 2 Ag / Antibody  -     RPR, Rfx Qn RPR / Confirm TP    2. FHx: colonic polyps  -     Ambulatory Referral to Gastroenterology    3. Abnormal mammogram of right breast  Overview:  7/20/23- Right DX mammogram and US done. US Breast Right Limited (07/24/2023 12:42) Mammo Diagnostic Right With CAD (07/20/2023)     7/24/23- Right breast Cyst aspiration. US Guided Breast Biopsy With & Without Device initial Right (08/03/2023)       4. Screen for STD (sexually transmitted disease)  -     Chlamydia trachomatis, Neisseria gonorrhoeae, Trichomonas vaginalis, PCR - Swab, Vagina  -     HIV-1 / O / 2 Ag / Antibody  -     RPR, Rfx Qn RPR / Confirm TP    5. Overactive bladder      -Breast and pelvic exam normal  - Pap up-to-date  - STD screen today  - Declines contraception  - Breast imaging scheduled for February.  Exam normal and without any nipple discharge.  Discussed monitoring symptoms and avoiding nipple stimulation.  - Discussed overactive bladder.  Recommended urogynecology consult.  Patient will consider and let me know.    Plan:  Return in about 1 year (around 12/28/2024) for Annual.      Gemma Mckinley MD  12/28/2023 16:19 EST

## 2023-12-29 ENCOUNTER — FLU SHOT (OUTPATIENT)
Dept: INTERNAL MEDICINE | Facility: CLINIC | Age: 43
End: 2023-12-29
Payer: COMMERCIAL

## 2023-12-29 DIAGNOSIS — Z23 NEED FOR INFLUENZA VACCINATION: Primary | ICD-10-CM

## 2023-12-29 LAB
C TRACH RRNA SPEC QL NAA+PROBE: NEGATIVE
HIV 1+2 AB+HIV1 P24 AG SERPL QL IA: NON REACTIVE
N GONORRHOEA RRNA SPEC QL NAA+PROBE: NEGATIVE
RPR SER QL: NON REACTIVE
T VAGINALIS RRNA SPEC QL NAA+PROBE: NEGATIVE

## 2023-12-29 PROCEDURE — 90686 IIV4 VACC NO PRSV 0.5 ML IM: CPT | Performed by: FAMILY MEDICINE

## 2023-12-29 PROCEDURE — 90471 IMMUNIZATION ADMIN: CPT | Performed by: FAMILY MEDICINE

## 2023-12-30 NOTE — PROGRESS NOTES
Please notify patient: STD panel was negative. Thank you!    Gemma Mckinley MD  12/30/2023  07:20 EST

## 2024-02-05 ENCOUNTER — APPOINTMENT (OUTPATIENT)
Dept: WOMENS IMAGING | Facility: HOSPITAL | Age: 44
End: 2024-02-05
Payer: COMMERCIAL

## 2024-02-05 PROCEDURE — G0279 TOMOSYNTHESIS, MAMMO: HCPCS | Performed by: RADIOLOGY

## 2024-02-05 PROCEDURE — 77066 DX MAMMO INCL CAD BI: CPT | Performed by: RADIOLOGY

## 2024-02-05 PROCEDURE — 77062 BREAST TOMOSYNTHESIS BI: CPT | Performed by: RADIOLOGY

## 2024-02-05 PROCEDURE — 76642 ULTRASOUND BREAST LIMITED: CPT | Performed by: RADIOLOGY

## 2024-03-11 ENCOUNTER — TELEPHONE (OUTPATIENT)
Dept: INTERNAL MEDICINE | Facility: CLINIC | Age: 44
End: 2024-03-11

## 2024-03-11 NOTE — TELEPHONE ENCOUNTER
Caller: Sameera Marrero    Relationship: Self    Best call back number: [phone]    What is the medical concern/diagnosis: FAMILY MEMBERS HAD POLYPS FOUND NEEDED REMOVED    What specialty or service is being requested: COLONOSCOPY    Any additional details: PATIENT STATED THAT HER MOTHER'S DOCTOR RECOMMEND THAT HER CHILDREN GET EARLIER COLONOSCOPY BECAUSE MULTIPLE PEOPLE IN THE FAMILY HAVE HAD POLYPS  FOUND AND PATIENT IS REQUESTING TO KNOW IF THE ORDERS CAN BE PLACED OR DOES SHE NEED TO COME IN. PATIENT REQUESTING CALLBACK TO LET HER KNOW.

## 2024-03-12 DIAGNOSIS — Z12.11 COLON CANCER SCREENING: Primary | ICD-10-CM

## 2024-04-08 ENCOUNTER — OFFICE VISIT (OUTPATIENT)
Dept: INTERNAL MEDICINE | Facility: CLINIC | Age: 44
End: 2024-04-08
Payer: COMMERCIAL

## 2024-04-08 VITALS
WEIGHT: 286 LBS | DIASTOLIC BLOOD PRESSURE: 70 MMHG | HEIGHT: 68 IN | OXYGEN SATURATION: 97 % | HEART RATE: 71 BPM | TEMPERATURE: 98.2 F | SYSTOLIC BLOOD PRESSURE: 126 MMHG | BODY MASS INDEX: 43.35 KG/M2

## 2024-04-08 DIAGNOSIS — Z00.00 PHYSICAL EXAM, ANNUAL: Primary | ICD-10-CM

## 2024-04-08 DIAGNOSIS — E55.9 VITAMIN D DEFICIENCY: ICD-10-CM

## 2024-04-08 PROCEDURE — 99396 PREV VISIT EST AGE 40-64: CPT | Performed by: FAMILY MEDICINE

## 2024-04-08 NOTE — PROGRESS NOTES
Subjective   Sameera Marrero is a 43 y.o. female.   Chief Complaint   Patient presents with    Annual Exam       History of Present Illness     CPE-patient is here for complete physical exam without GYN evaluation.  She has no complaints.    She had shingles infection in September 2023.  It resolved.  She had COVID infection in November 2023.  She recovered fully.  Otherwise there is no change in medical or surgical history from last office visit.  No ER visits or hospitalizations.  No change in family history.  No change in over-the-counter medications.  She takes multivitamin alternating with vitamin D at 1000 units a day.  She takes Gas-X as needed.  She is not allergic to any new medications.  She does not use tobacco products.  She drinks about 2 drinks a week on average.  No drugs.  She exercises irregularly.  She joined the gym in January 2024.  Dental appointments every 6 months.  Last eye exam less than 2 years ago.  She was evaluated by her GYN in December 2023.  She had Pap smear in 2022.  She had mammogram in summer 2023.  Biopsy of the right breast was negative.  She follows up with dermatologist yearly.  She was diagnosed with rosacea.  No suspicious skin lesions.  She had tetanus vaccine 2018.  Flu vaccine this season.  She had COVID-vaccine x 3.    Review of Systems   Constitutional: Negative.  Negative for chills and fever.   Respiratory:  Negative for shortness of breath.    Cardiovascular:  Negative for chest pain and palpitations.   Gastrointestinal:  Negative for blood in stool.   Genitourinary:  Negative for hematuria.   Neurological:  Negative for light-headedness.   Psychiatric/Behavioral: Negative.  Negative for suicidal ideas.          Objective   Wt Readings from Last 3 Encounters:   04/08/24 130 kg (286 lb)   12/28/23 133 kg (293 lb 9.6 oz)   09/26/23 129 kg (283 lb 11.2 oz)      Vitals:    04/08/24 0913   BP: 126/70   Pulse: 71   Temp: 98.2 °F (36.8 °C)   SpO2: 97%     Temp Readings  from Last 3 Encounters:   04/08/24 98.2 °F (36.8 °C)   09/26/23 97.6 °F (36.4 °C)   08/28/23 98.9 °F (37.2 °C)     BP Readings from Last 3 Encounters:   04/08/24 126/70   12/28/23 128/82   09/26/23 116/76     Pulse Readings from Last 3 Encounters:   04/08/24 71   09/26/23 70   08/28/23 74     Body mass index is 43.5 kg/m².    Physical Exam  Vitals reviewed.   Constitutional:       General: She is not in acute distress.     Appearance: She is well-developed. She is not diaphoretic.   HENT:      Head: Normocephalic and atraumatic. Hair is normal.      Right Ear: Hearing, tympanic membrane, ear canal and external ear normal. No decreased hearing noted. No drainage.      Left Ear: Hearing, tympanic membrane, ear canal and external ear normal. No decreased hearing noted.      Nose: No nasal deformity.   Eyes:      General: Lids are normal.         Right eye: No discharge.         Left eye: No discharge.      Conjunctiva/sclera: Conjunctivae normal.      Pupils: Pupils are equal, round, and reactive to light.   Neck:      Thyroid: No thyromegaly.      Vascular: No JVD.      Trachea: No tracheal deviation.   Cardiovascular:      Rate and Rhythm: Normal rate and regular rhythm.      Pulses: Normal pulses.      Heart sounds: Normal heart sounds. No murmur heard.     No friction rub. No gallop.   Pulmonary:      Effort: Pulmonary effort is normal. No respiratory distress.      Breath sounds: Normal breath sounds. No wheezing or rales.   Chest:      Chest wall: No tenderness.   Abdominal:      General: Bowel sounds are normal. There is no distension.      Palpations: Abdomen is soft. There is no mass.      Tenderness: There is no abdominal tenderness. There is no guarding or rebound.      Hernia: No hernia is present.   Musculoskeletal:         General: No tenderness or deformity. Normal range of motion.      Cervical back: Normal range of motion and neck supple.   Lymphadenopathy:      Cervical: No cervical adenopathy.    Skin:     General: Skin is warm and dry.      Findings: No erythema or rash.   Neurological:      Mental Status: She is alert and oriented to person, place, and time.      Cranial Nerves: No cranial nerve deficit.      Motor: No abnormal muscle tone.      Coordination: Coordination normal.      Deep Tendon Reflexes: Reflexes are normal and symmetric. Reflexes normal.   Psychiatric:         Behavior: Behavior normal.         Thought Content: Thought content normal.         Judgment: Judgment normal.         Assessment & Plan   Diagnoses and all orders for this visit:    1. Physical exam, annual (Primary)  -     CBC (No Diff)  -     Comprehensive Metabolic Panel  -     Lipid Panel With LDL / HDL Ratio  -     Thyroid Cascade Profile    2. Vitamin D deficiency  -     Vitamin D,25-Hydroxy        CPE-we are checking labs today.  She is advised to work on losing weight.  Regular exercise would be helpful.  Planning meals would be helpful.  Information on exercise to lose weight added to discharge summary.  She is up-to-date with cancer screening.  We discussed recommendations for vaccinations including COVID-vaccine.  Sun protection recommended.  Continue regular dental appointments at least every 6 months.    She says that her mom was told by her doctor that all of her children should be having colonoscopy.  As far as Sameera knows her mom had precancerous polyps.  Patient was referred to GI last month.  It is pending.          Class 3 Severe Obesity (BMI >=40). Obesity-related health conditions include the following: none. Obesity is improving with lifestyle modifications. BMI is is above average; BMI management plan is completed. We discussed portion control and increasing exercise.

## 2024-04-09 LAB
25(OH)D3+25(OH)D2 SERPL-MCNC: 21.8 NG/ML (ref 30–100)
ALBUMIN SERPL-MCNC: 4.6 G/DL (ref 3.5–5.2)
ALBUMIN/GLOB SERPL: 1.9 G/DL
ALP SERPL-CCNC: 77 U/L (ref 39–117)
ALT SERPL-CCNC: 22 U/L (ref 1–33)
AST SERPL-CCNC: 22 U/L (ref 1–32)
BILIRUB SERPL-MCNC: 0.4 MG/DL (ref 0–1.2)
BUN SERPL-MCNC: 9 MG/DL (ref 6–20)
BUN/CREAT SERPL: 9.8 (ref 7–25)
CALCIUM SERPL-MCNC: 9.7 MG/DL (ref 8.6–10.5)
CHLORIDE SERPL-SCNC: 102 MMOL/L (ref 98–107)
CHOLEST SERPL-MCNC: 187 MG/DL (ref 0–200)
CO2 SERPL-SCNC: 24.5 MMOL/L (ref 22–29)
CREAT SERPL-MCNC: 0.92 MG/DL (ref 0.57–1)
EGFRCR SERPLBLD CKD-EPI 2021: 80.4 ML/MIN/1.73
ERYTHROCYTE [DISTWIDTH] IN BLOOD BY AUTOMATED COUNT: 13.5 % (ref 12.3–15.4)
GLOBULIN SER CALC-MCNC: 2.4 GM/DL
GLUCOSE SERPL-MCNC: 94 MG/DL (ref 65–99)
HCT VFR BLD AUTO: 42.7 % (ref 34–46.6)
HDLC SERPL-MCNC: 48 MG/DL (ref 40–60)
HGB BLD-MCNC: 13.9 G/DL (ref 12–15.9)
LDLC SERPL CALC-MCNC: 113 MG/DL (ref 0–100)
LDLC/HDLC SERPL: 2.27 {RATIO}
MCH RBC QN AUTO: 28.2 PG (ref 26.6–33)
MCHC RBC AUTO-ENTMCNC: 32.6 G/DL (ref 31.5–35.7)
MCV RBC AUTO: 86.6 FL (ref 79–97)
PLATELET # BLD AUTO: 279 10*3/MM3 (ref 140–450)
POTASSIUM SERPL-SCNC: 4.2 MMOL/L (ref 3.5–5.2)
PROT SERPL-MCNC: 7 G/DL (ref 6–8.5)
RBC # BLD AUTO: 4.93 10*6/MM3 (ref 3.77–5.28)
SODIUM SERPL-SCNC: 139 MMOL/L (ref 136–145)
TRIGL SERPL-MCNC: 150 MG/DL (ref 0–150)
TSH SERPL DL<=0.005 MIU/L-ACNC: 1.43 UIU/ML (ref 0.45–4.5)
VLDLC SERPL CALC-MCNC: 26 MG/DL (ref 5–40)
WBC # BLD AUTO: 6.31 10*3/MM3 (ref 3.4–10.8)

## 2024-12-02 ENCOUNTER — TELEPHONE (OUTPATIENT)
Dept: OBSTETRICS AND GYNECOLOGY | Age: 44
End: 2024-12-02
Payer: COMMERCIAL

## 2024-12-13 ENCOUNTER — FLU SHOT (OUTPATIENT)
Dept: INTERNAL MEDICINE | Facility: CLINIC | Age: 44
End: 2024-12-13
Payer: COMMERCIAL

## 2024-12-13 DIAGNOSIS — Z23 NEED FOR INFLUENZA VACCINATION: Primary | ICD-10-CM

## 2024-12-13 PROCEDURE — 90656 IIV3 VACC NO PRSV 0.5 ML IM: CPT | Performed by: FAMILY MEDICINE

## 2024-12-13 PROCEDURE — 90471 IMMUNIZATION ADMIN: CPT | Performed by: FAMILY MEDICINE

## 2025-01-08 ENCOUNTER — OFFICE VISIT (OUTPATIENT)
Dept: OBSTETRICS AND GYNECOLOGY | Age: 45
End: 2025-01-08
Payer: COMMERCIAL

## 2025-01-08 VITALS
SYSTOLIC BLOOD PRESSURE: 128 MMHG | WEIGHT: 293 LBS | BODY MASS INDEX: 44.41 KG/M2 | DIASTOLIC BLOOD PRESSURE: 86 MMHG | HEIGHT: 68 IN

## 2025-01-08 DIAGNOSIS — Z01.419 WELL WOMAN EXAM WITH ROUTINE GYNECOLOGICAL EXAM: Primary | ICD-10-CM

## 2025-01-08 DIAGNOSIS — Z11.3 SCREEN FOR STD (SEXUALLY TRANSMITTED DISEASE): ICD-10-CM

## 2025-01-08 DIAGNOSIS — Z12.31 SCREENING MAMMOGRAM FOR BREAST CANCER: ICD-10-CM

## 2025-01-08 NOTE — PROGRESS NOTES
[facility]   Obstetrics and Gynecology   Routine Annual Visit    2025    Patient: Sameera Marrero          MR#:[id number]    History of Present Illness    Chief Complaint   Patient presents with    Gynecologic Exam     Annual today, Last annual 23, Last Pap 22, Last mammo 23, Pt has no complaints       44 y.o. female  who presents for annual exam.  Reports regular monthly menses without issue.      Studies reviewed:  US Guided Breast Biopsy With & Without Device initial Right (2023) right breast bx c/w fibroadenoma  MAMMO - SCAN - MAMMO DIAGNOSTIC DIGITAL TOMOSYNTHESIS BILATERAL W CAD/US RT BREAST, WDC, 2024 (2024) normal, return to regular screening  IGP,CtNgTv,Apt HPV,rfx 16 / 18,45 (2022 15:34) NIL, HPV neg, denies h/o abnormal paps  Never had colonoscopy but is scheduling via PCP due to fhx colonic polyps    Obstetric History:  OB History          0    Para   0    Term   0       0    AB   0    Living   0         SAB   0    IAB   0    Ectopic   0    Molar   0    Multiple   0    Live Births   0               Menstrual History:     Patient's last menstrual period was 2024 (exact date).       Sexual History:   Sexually with male partner, desires STD testing       Social History:   Works in finance     ________________________________________  Patient Active Problem List   Diagnosis    Hemorrhoids    Morbid obesity    Vitamin D deficiency    Abnormal mammogram of right breast     Past Medical History:   Diagnosis Date    Anxiety     NO MED    Avitaminosis D 2016    Chlamydia     Gallstones     GERD (gastroesophageal reflux disease)     possible    Vitamin D deficiency      Past Surgical History:   Procedure Laterality Date    BREAST BIOPSY Right     fibroadenoma    CHOLECYSTECTOMY WITH INTRAOPERATIVE CHOLANGIOGRAM N/A 10/25/2016    Procedure: LAPAROSCOPIC CHOLECYSTECTOMY WITH CHOLANGIOGRAM;  Surgeon: Delvin Torres MD;   "Location: [facility] OR;  Service:     EXCISIONAL HEMORRHOIDECTOMY      IN OFFICE    WISDOM TOOTH EXTRACTION       Social History     Tobacco Use   Smoking Status Never    Passive exposure: Never   Smokeless Tobacco Never     Family History   Problem Relation Age of Onset    No Known Problems Mother     Cancer Maternal Grandfather         Unknown type    Colon cancer Other     Breast cancer Neg Hx     Ovarian cancer Neg Hx     Uterine cancer Neg Hx      Prior to Admission medications    Medication Sig Start Date End Date Taking? Authorizing Provider   Cholecalciferol (VITAMIN D3) 2000 units tablet Take  by mouth.   Yes Yamileth Rodriguez MD   Multiple Vitamin (MULTI VITAMIN DAILY PO) Take 1 tablet by mouth Daily.   Yes Yamileth Rodriguez MD   Simethicone (GAS-X PO) Take  by mouth As Needed.   Yes Yamileth Rodriguez MD     ________________________________________    Current contraception: none  History of abnormal Pap smear: no  Family history of uterine or ovarian cancer: no  Family History of colon cancer/colon polyps: yes - see above  History of abnormal mammogram: yes - see above    The following portions of the patient's history were reviewed and updated as appropriate: allergies, current medications, past family history, past medical history, past social history, past surgical history, and problem list.    Review of Systems   All other systems reviewed and are negative.           Objective     /86   Ht 172.7 cm (67.99\")   Wt 135 kg (296 lb 12.8 oz)   LMP 12/31/2024 (Exact Date)   BMI 45.14 kg/m²    BP Readings from Last 3 Encounters:   01/08/25 128/86   04/08/24 126/70   12/28/23 128/82      Wt Readings from Last 3 Encounters:   01/08/25 135 kg (296 lb 12.8 oz)   04/08/24 130 kg (286 lb)   12/28/23 133 kg (293 lb 9.6 oz)        BMI: Estimated body mass index is 45.14 kg/m² as calculated from the following:    Height as of this encounter: 172.7 cm (67.99\").    Weight as of this encounter: 135 " kg (296 lb 12.8 oz).    Physical Exam  Vitals and nursing note reviewed.   Constitutional:       General: She is not in acute distress.     Appearance: Normal appearance.   HENT:      Head: Normocephalic and atraumatic.   Eyes:      Extraocular Movements: Extraocular movements intact.   Cardiovascular:      Rate and Rhythm: Normal rate and regular rhythm.      Pulses: Normal pulses.      Heart sounds: No murmur heard.  Pulmonary:      Effort: Pulmonary effort is normal. No respiratory distress.      Breath sounds: Normal breath sounds.   Chest:   Breasts:     Right: Normal. No mass, nipple discharge, skin change or tenderness.      Left: Normal. No mass, nipple discharge, skin change or tenderness.   Abdominal:      General: There is no distension.      Palpations: Abdomen is soft. There is no mass.      Tenderness: There is no abdominal tenderness.   Genitourinary:     General: Normal vulva.      Labia:         Right: No rash or lesion.         Left: No rash or lesion.       Urethra: No prolapse, urethral swelling or urethral lesion.      Vagina: Normal.      Cervix: Normal.      Uterus: Normal.       Adnexa: Right adnexa normal and left adnexa normal.      Comments: Bladder: no masses or tenderness  Perineum/Anus: no masses, lesions, or skin changes  Musculoskeletal:         General: No swelling. Normal range of motion.      Cervical back: Normal range of motion.   Lymphadenopathy:      Upper Body:      Right upper body: No axillary adenopathy.      Left upper body: No axillary adenopathy.   Skin:     General: Skin is warm and dry.   Neurological:      General: No focal deficit present.      Mental Status: She is alert and oriented to person, place, and time.   Psychiatric:         Mood and Affect: Mood normal.         Behavior: Behavior normal.         As part of wellness and prevention, the following topics were discussed with the patient:  Encouraged self breast exam  Physical activity and regular exercised  encouraged.   Injury prevention discussed.  Healthy weight discussed.  Nutrition discussed.  Substance abuse/misuse discussed.  Sexual behavior/safe practices discussed.   Sexual transmitted disease prevention   Contraception discussed.   Mental health discussed.           Assessment:  Diagnoses and all orders for this visit:    1. Well woman exam with routine gynecological exam (Primary)  -     Mammo Screening Digital Tomosynthesis Bilateral With CAD; Future  -     HIV-1 / O / 2 Ag / Antibody  -     RPR, Rfx Qn RPR / Confirm TP  -     Chlamydia trachomatis, Neisseria gonorrhoeae, Trichomonas vaginalis, PCR - Swab, Vagina    2. Screening mammogram for breast cancer  -     Mammo Screening Digital Tomosynthesis Bilateral With CAD; Future    3. Screen for STD (sexually transmitted disease)  -     HIV-1 / O / 2 Ag / Antibody  -     RPR, Rfx Qn RPR / Confirm TP  -     Chlamydia trachomatis, Neisseria gonorrhoeae, Trichomonas vaginalis, PCR - Swab, Vagina      -Breast and pelvic exam normal  - Pap up-to-date  - STD screen today  - Mammogram ordered, due next month  - Patient is scheduling colonoscopy herself or family history of colonic polyps.    Plan:  Return in about 1 year (around 1/8/2026) for Annual.      Gemma Mckinley MD  1/8/2025 16:44 EST

## 2025-01-09 LAB
HIV 1+2 AB+HIV1 P24 AG SERPL QL IA: NON REACTIVE
RPR SER QL: NON REACTIVE

## 2025-01-10 LAB
C TRACH RRNA SPEC QL NAA+PROBE: NEGATIVE
N GONORRHOEA RRNA SPEC QL NAA+PROBE: NEGATIVE
T VAGINALIS RRNA SPEC QL NAA+PROBE: NEGATIVE

## 2025-01-14 ENCOUNTER — OFFICE VISIT (OUTPATIENT)
Dept: INTERNAL MEDICINE | Facility: CLINIC | Age: 45
End: 2025-01-14
Payer: COMMERCIAL

## 2025-01-14 VITALS
TEMPERATURE: 97.3 F | WEIGHT: 293 LBS | BODY MASS INDEX: 44.41 KG/M2 | HEART RATE: 86 BPM | OXYGEN SATURATION: 98 % | DIASTOLIC BLOOD PRESSURE: 70 MMHG | HEIGHT: 68 IN | SYSTOLIC BLOOD PRESSURE: 126 MMHG

## 2025-01-14 DIAGNOSIS — G56.01 CARPAL TUNNEL SYNDROME OF RIGHT WRIST: Primary | ICD-10-CM

## 2025-01-14 PROCEDURE — 99213 OFFICE O/P EST LOW 20 MIN: CPT | Performed by: FAMILY MEDICINE

## 2025-01-14 NOTE — PROGRESS NOTES
Subjective   Sameera Marrero is a 44 y.o. female.   Chief Complaint   Patient presents with    Numbness       History of Present Illness     Numbness-affecting right second and third finger.  She has pins and needle sensation when she touches it.  It started months ago.  It is worse in the last 2 weeks.  It is worse when she types.  Last night it woke her up.  It got better when she stretched her hand.      Review of Systems   Skin: Negative.    Neurological:  Positive for numbness.         Objective   Wt Readings from Last 3 Encounters:   01/14/25 135 kg (298 lb)   01/08/25 135 kg (296 lb 12.8 oz)   04/08/24 130 kg (286 lb)      Vitals:    01/14/25 1313   BP: 126/70   Pulse: 86   Temp: 97.3 °F (36.3 °C)   SpO2: 98%     Temp Readings from Last 3 Encounters:   01/14/25 97.3 °F (36.3 °C)   04/08/24 98.2 °F (36.8 °C)   09/26/23 97.6 °F (36.4 °C)     BP Readings from Last 3 Encounters:   01/14/25 126/70   01/08/25 128/86   04/08/24 126/70     Pulse Readings from Last 3 Encounters:   01/14/25 86   04/08/24 71   09/26/23 70     Body mass index is 45.32 kg/m².    Physical Exam  Constitutional:       Appearance: Normal appearance.   Cardiovascular:      Rate and Rhythm: Normal rate and regular rhythm.      Heart sounds: Normal heart sounds.   Pulmonary:      Effort: Pulmonary effort is normal.      Breath sounds: Normal breath sounds.   Musculoskeletal:      Comments: Muscle strength 5/5 BL x UE.  Phalen negative BL.   Skin:     General: Skin is warm and dry.         Assessment & Plan   Diagnoses and all orders for this visit:    1. Carpal tunnel syndrome of right wrist (Primary)  -     Ambulatory Referral to Hand Surgery        Carpal tunnel syndrome-patient is advised to use wrist brace.  We discussed modification of activities.  Referral to hand surgeon.

## 2025-02-07 ENCOUNTER — APPOINTMENT (OUTPATIENT)
Dept: WOMENS IMAGING | Facility: HOSPITAL | Age: 45
End: 2025-02-07
Payer: COMMERCIAL

## 2025-02-07 PROCEDURE — 77063 BREAST TOMOSYNTHESIS BI: CPT | Performed by: RADIOLOGY

## 2025-02-07 PROCEDURE — 77067 SCR MAMMO BI INCL CAD: CPT | Performed by: RADIOLOGY

## 2025-06-09 ENCOUNTER — TELEPHONE (OUTPATIENT)
Dept: INTERNAL MEDICINE | Facility: CLINIC | Age: 45
End: 2025-06-09

## 2025-06-09 NOTE — TELEPHONE ENCOUNTER
Caller: Sameera Marrero    Relationship: Self    Best call back number: [phone]     What is the best time to reach you: ANY    Who are you requesting to speak with (clinical staff, provider,  specific staff member): CLINICAL    Do you know the name of the person who called: SAMEERA    What was the call regarding:     WENT TO URGENT CARE AND GOT ANTIBIOTIC AND STEROID. SHE WAS TOLD TO GET OTC MEDICINES TO TAKE ALSO. SHOULD SHE BE TAKING ALL THESE MEDICINES AT THE SAME TIME? SHOULD SHE BE TAKING SUPPLEMENTS ALSO?

## 2025-06-18 ENCOUNTER — TELEPHONE (OUTPATIENT)
Dept: INTERNAL MEDICINE | Facility: CLINIC | Age: 45
End: 2025-06-18

## 2025-06-18 NOTE — TELEPHONE ENCOUNTER
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     PATIENT STATES THAT SHE WAS THEN SEEN AT THE URGENT CARE FOR A POSSIBLE SINUS INFECTION AND WAS PRESCRIBED doxycycline (MONODOX) 100 MG capsule    PATIENT STATES THAT SHE WAS GIVEN A 7 DAY SUPPLY OF THAT MEDICATION.     PATIENT STATES THAT SHE CALLED URGENT CARE TO GET DIRECTION ON HOW SHE NEEDED TO TAKE THIS WITH THE ANTIBIOTICS FROM HER DERMATOLOGIST BECAUSE THEY WERE NOT SPECIFIC AND PATIENT WAS TOLD THAT PROVIDER WAS NOT AVAILABLE.     PATIENT HAS BEEN TAKING THIS MEDICATION FOR 12 DAYS.     PATIENT IS NEEDING TO DISCUSS HOW SHE NEEDS TO HANDLE TAKING THESE MEDICATIONS AS SHE HAS CONCERNS ABOUT TIMING AND HOW LONG SHE SHOULD BE ON IT.     PLEASE CALL TO DISCUSS.